# Patient Record
Sex: MALE | Race: WHITE | Employment: FULL TIME | URBAN - METROPOLITAN AREA
[De-identification: names, ages, dates, MRNs, and addresses within clinical notes are randomized per-mention and may not be internally consistent; named-entity substitution may affect disease eponyms.]

---

## 2019-03-01 ENCOUNTER — OFFICE VISIT (OUTPATIENT)
Dept: FAMILY MEDICINE CLINIC | Age: 54
End: 2019-03-01
Payer: COMMERCIAL

## 2019-03-01 VITALS
DIASTOLIC BLOOD PRESSURE: 128 MMHG | TEMPERATURE: 98.4 F | WEIGHT: 198.5 LBS | RESPIRATION RATE: 16 BRPM | BODY MASS INDEX: 29.4 KG/M2 | SYSTOLIC BLOOD PRESSURE: 210 MMHG | HEIGHT: 69 IN | OXYGEN SATURATION: 100 % | HEART RATE: 84 BPM

## 2019-03-01 DIAGNOSIS — I10 MALIGNANT HYPERTENSION: Primary | ICD-10-CM

## 2019-03-01 PROCEDURE — 93000 ELECTROCARDIOGRAM COMPLETE: CPT | Performed by: FAMILY MEDICINE

## 2019-03-01 PROCEDURE — 99214 OFFICE O/P EST MOD 30 MIN: CPT | Performed by: FAMILY MEDICINE

## 2019-03-01 ASSESSMENT — ENCOUNTER SYMPTOMS
CHEST TIGHTNESS: 0
ABDOMINAL PAIN: 0
NAUSEA: 0
VOMITING: 0
DIARRHEA: 0
APNEA: 0
SHORTNESS OF BREATH: 0
COUGH: 0
CONSTIPATION: 0
BLOOD IN STOOL: 0

## 2019-03-01 ASSESSMENT — PATIENT HEALTH QUESTIONNAIRE - PHQ9
1. LITTLE INTEREST OR PLEASURE IN DOING THINGS: 0
SUM OF ALL RESPONSES TO PHQ9 QUESTIONS 1 & 2: 0
SUM OF ALL RESPONSES TO PHQ QUESTIONS 1-9: 0
SUM OF ALL RESPONSES TO PHQ QUESTIONS 1-9: 0
2. FEELING DOWN, DEPRESSED OR HOPELESS: 0

## 2019-03-18 ENCOUNTER — OFFICE VISIT (OUTPATIENT)
Dept: FAMILY MEDICINE CLINIC | Age: 54
End: 2019-03-18
Payer: COMMERCIAL

## 2019-03-18 VITALS
WEIGHT: 197.06 LBS | OXYGEN SATURATION: 98 % | BODY MASS INDEX: 29.19 KG/M2 | HEART RATE: 75 BPM | SYSTOLIC BLOOD PRESSURE: 148 MMHG | TEMPERATURE: 98.1 F | DIASTOLIC BLOOD PRESSURE: 90 MMHG | HEIGHT: 69 IN | RESPIRATION RATE: 12 BRPM

## 2019-03-18 DIAGNOSIS — Z12.11 COLON CANCER SCREENING: ICD-10-CM

## 2019-03-18 DIAGNOSIS — E78.2 MIXED HYPERLIPIDEMIA: ICD-10-CM

## 2019-03-18 DIAGNOSIS — Z13.1 SCREENING FOR DIABETES MELLITUS (DM): ICD-10-CM

## 2019-03-18 DIAGNOSIS — I10 ESSENTIAL HYPERTENSION: Primary | ICD-10-CM

## 2019-03-18 DIAGNOSIS — Z23 NEED FOR VACCINATION: ICD-10-CM

## 2019-03-18 PROCEDURE — 99214 OFFICE O/P EST MOD 30 MIN: CPT | Performed by: FAMILY MEDICINE

## 2019-03-18 RX ORDER — VALSARTAN 160 MG/1
160 TABLET ORAL DAILY
Qty: 30 TABLET | Refills: 5 | Status: SHIPPED | OUTPATIENT
Start: 2019-03-18 | End: 2019-04-23 | Stop reason: DRUGHIGH

## 2019-03-18 RX ORDER — TRIAMTERENE AND HYDROCHLOROTHIAZIDE 37.5; 25 MG/1; MG/1
TABLET ORAL
Qty: 30 TABLET | Refills: 5 | Status: SHIPPED | OUTPATIENT
Start: 2019-03-18 | End: 2019-04-23 | Stop reason: DRUGHIGH

## 2019-03-18 RX ORDER — ATORVASTATIN CALCIUM 20 MG/1
20 TABLET, FILM COATED ORAL DAILY
Refills: 0 | COMMUNITY
Start: 2019-03-04 | End: 2019-03-18 | Stop reason: SDUPTHER

## 2019-03-18 RX ORDER — VALSARTAN 160 MG/1
160 TABLET ORAL DAILY
Refills: 0 | COMMUNITY
Start: 2019-03-04 | End: 2019-03-18 | Stop reason: SDUPTHER

## 2019-03-18 RX ORDER — ATORVASTATIN CALCIUM 20 MG/1
20 TABLET, FILM COATED ORAL DAILY
Qty: 30 TABLET | Refills: 5 | Status: SHIPPED | OUTPATIENT
Start: 2019-03-18 | End: 2019-08-29 | Stop reason: SDUPTHER

## 2019-03-18 RX ORDER — POTASSIUM CHLORIDE 750 MG/1
10 TABLET, FILM COATED, EXTENDED RELEASE ORAL DAILY
Refills: 0 | COMMUNITY
Start: 2019-03-04 | End: 2019-03-18 | Stop reason: SDUPTHER

## 2019-03-18 RX ORDER — POTASSIUM CHLORIDE 750 MG/1
10 TABLET, FILM COATED, EXTENDED RELEASE ORAL DAILY
Qty: 60 TABLET | Refills: 5 | Status: SHIPPED | OUTPATIENT
Start: 2019-03-18 | End: 2020-02-25

## 2019-03-18 RX ORDER — AMLODIPINE BESYLATE 10 MG/1
10 TABLET ORAL DAILY
Qty: 30 TABLET | Refills: 5 | Status: SHIPPED | OUTPATIENT
Start: 2019-03-18 | End: 2019-04-23 | Stop reason: ALTCHOICE

## 2019-03-18 RX ORDER — AMLODIPINE BESYLATE 10 MG/1
10 TABLET ORAL DAILY
Refills: 0 | COMMUNITY
Start: 2019-03-04 | End: 2019-03-18 | Stop reason: SDUPTHER

## 2019-03-18 RX ORDER — TRIAMTERENE AND HYDROCHLOROTHIAZIDE 37.5; 25 MG/1; MG/1
TABLET ORAL
Refills: 0 | COMMUNITY
Start: 2019-03-04 | End: 2019-03-18 | Stop reason: SDUPTHER

## 2019-03-18 ASSESSMENT — ENCOUNTER SYMPTOMS
NAUSEA: 0
CONSTIPATION: 0
ABDOMINAL PAIN: 0
CHEST TIGHTNESS: 0
APNEA: 0
SHORTNESS OF BREATH: 0
DIARRHEA: 0
VOMITING: 0
BLOOD IN STOOL: 0
COUGH: 0

## 2019-04-06 ENCOUNTER — HOSPITAL ENCOUNTER (OUTPATIENT)
Dept: LAB | Age: 54
Discharge: HOME | End: 2019-04-06
Payer: COMMERCIAL

## 2019-04-06 DIAGNOSIS — I10 ESSENTIAL HYPERTENSION: ICD-10-CM

## 2019-04-06 LAB
ALBUMIN SERPL-MCNC: 4.2 G/DL (ref 3.5–4.6)
ALP BLD-CCNC: 80 U/L (ref 35–104)
ALT SERPL-CCNC: 35 U/L (ref 0–41)
ANION GAP SERPL CALCULATED.3IONS-SCNC: 14 MEQ/L (ref 9–15)
AST SERPL-CCNC: 32 U/L (ref 0–40)
BILIRUB SERPL-MCNC: 0.5 MG/DL (ref 0.2–0.7)
BUN BLDV-MCNC: 15 MG/DL (ref 6–20)
CALCIUM SERPL-MCNC: 9.1 MG/DL (ref 8.5–9.9)
CHLORIDE BLD-SCNC: 99 MEQ/L (ref 95–107)
CO2: 25 MEQ/L (ref 20–31)
CREAT SERPL-MCNC: 1.15 MG/DL (ref 0.7–1.2)
GFR AFRICAN AMERICAN: >60
GFR NON-AFRICAN AMERICAN: >60
GLOBULIN: 2.6 G/DL (ref 2.3–3.5)
GLUCOSE BLD-MCNC: 105 MG/DL (ref 70–99)
POTASSIUM SERPL-SCNC: 4.1 MEQ/L (ref 3.4–4.9)
SODIUM BLD-SCNC: 138 MEQ/L (ref 135–144)
TOTAL PROTEIN: 6.8 G/DL (ref 6.3–8)

## 2019-04-06 PROCEDURE — 80053 COMPREHEN METABOLIC PANEL: CPT

## 2019-04-06 PROCEDURE — 36415 COLL VENOUS BLD VENIPUNCTURE: CPT

## 2019-04-16 ENCOUNTER — OFFICE VISIT (OUTPATIENT)
Dept: FAMILY MEDICINE CLINIC | Age: 54
End: 2019-04-16
Payer: COMMERCIAL

## 2019-04-16 VITALS
TEMPERATURE: 98 F | RESPIRATION RATE: 16 BRPM | HEART RATE: 76 BPM | BODY MASS INDEX: 29.53 KG/M2 | DIASTOLIC BLOOD PRESSURE: 90 MMHG | OXYGEN SATURATION: 97 % | HEIGHT: 69 IN | SYSTOLIC BLOOD PRESSURE: 160 MMHG | WEIGHT: 199.4 LBS

## 2019-04-16 DIAGNOSIS — I10 MALIGNANT HYPERTENSION: Primary | ICD-10-CM

## 2019-04-16 PROCEDURE — 99213 OFFICE O/P EST LOW 20 MIN: CPT | Performed by: FAMILY MEDICINE

## 2019-04-16 NOTE — PROGRESS NOTES
Subjective:      Patient ID: Leland Gregg is a 52 y.o. male who presents today for:     Chief Complaint   Patient presents with    Hypertension     Patient is here for a 1 month follow up HTN.  Hyperlipidemia     Patient is here for a 1 month follow up hyperlipidemia. Hypertension   This is a chronic problem. The problem has been rapidly improving since onset. The problem is uncontrolled. Pertinent negatives include no anxiety, blurred vision, chest pain, headaches, malaise/fatigue, neck pain, orthopnea, palpitations, peripheral edema, PND, shortness of breath or sweats. Risk factors for coronary artery disease include male gender. Past treatments include ACE inhibitors, diuretics and calcium channel blockers. The current treatment provides significant improvement. There are no compliance problems. patient states her blood pressures been running in the 130/90 range at home. He states that he feels better and dull headache is almost completely resolved and the blood pressure is within an improved range    Past Medical History:   Diagnosis Date    Hyperlipidemia     Hypertension      Past Surgical History:   Procedure Laterality Date    HERNIA REPAIR       No family history on file. Social History     Socioeconomic History    Marital status: Single     Spouse name: Not on file    Number of children: Not on file    Years of education: Not on file    Highest education level: Not on file   Occupational History    Not on file   Social Needs    Financial resource strain: Not on file    Food insecurity:     Worry: Not on file     Inability: Not on file    Transportation needs:     Medical: Not on file     Non-medical: Not on file   Tobacco Use    Smoking status: Light Tobacco Smoker     Packs/day: 0.25     Years: 40.00     Pack years: 10.00     Types: Cigarettes    Smokeless tobacco: Never Used   Substance and Sexual Activity    Alcohol use:  Yes    Drug use: No    Sexual activity: Yes Lifestyle    Physical activity:     Days per week: Not on file     Minutes per session: Not on file    Stress: Not on file   Relationships    Social connections:     Talks on phone: Not on file     Gets together: Not on file     Attends Protestant service: Not on file     Active member of club or organization: Not on file     Attends meetings of clubs or organizations: Not on file     Relationship status: Not on file    Intimate partner violence:     Fear of current or ex partner: Not on file     Emotionally abused: Not on file     Physically abused: Not on file     Forced sexual activity: Not on file   Other Topics Concern    Not on file   Social History Narrative    Not on file     Current Outpatient Medications on File Prior to Visit   Medication Sig Dispense Refill    amLODIPine (NORVASC) 10 MG tablet Take 1 tablet by mouth daily 30 tablet 5    atorvastatin (LIPITOR) 20 MG tablet Take 1 tablet by mouth daily 30 tablet 5    potassium chloride (KLOR-CON) 10 MEQ extended release tablet Take 1 tablet by mouth daily 60 tablet 5    triamterene-hydrochlorothiazide (MAXZIDE-25) 37.5-25 MG per tablet take 1 (ONE) tablet daily 30 tablet 5    valsartan (DIOVAN) 160 MG tablet Take 1 tablet by mouth daily 30 tablet 5     No current facility-administered medications on file prior to visit. Allergies:  Patient has no known allergies. Review of Systems   Constitutional: Negative for activity change, appetite change, fatigue and malaise/fatigue. Eyes: Negative for blurred vision. Respiratory: Negative for apnea, cough, chest tightness and shortness of breath. Cardiovascular: Negative for chest pain, palpitations, orthopnea, leg swelling and PND. Gastrointestinal: Negative for abdominal pain, blood in stool, constipation, diarrhea, nausea and vomiting. Musculoskeletal: Negative for arthralgias and neck pain. Neurological: Negative for seizures and headaches.    Psychiatric/Behavioral: Negative for hallucinations and suicidal ideas. Objective:   BP (!) 160/90 (Site: Left Upper Arm, Position: Sitting, Cuff Size: Large Adult)   Pulse 76   Temp 98 °F (36.7 °C) (Temporal)   Resp 16   Ht 5' 9\" (1.753 m)   Wt 199 lb 6.4 oz (90.4 kg)   SpO2 97%   BMI 29.45 kg/m²     Physical Exam   Constitutional: He is oriented to person, place, and time. He appears well-developed and well-nourished. No distress. HENT:   Head: Normocephalic and atraumatic. Eyes: Pupils are equal, round, and reactive to light. Conjunctivae and EOM are normal.   Neck: Normal range of motion. Cardiovascular: Normal rate, regular rhythm and normal heart sounds. Exam reveals no gallop and no friction rub. No murmur heard. Pulmonary/Chest: Effort normal and breath sounds normal. No respiratory distress. He has no wheezes. He has no rales. He exhibits no tenderness. Neurological: He is alert and oriented to person, place, and time. Skin: Skin is warm and dry. He is not diaphoretic. Psychiatric: He has a normal mood and affect. His behavior is normal. Judgment and thought content normal.   Nursing note and vitals reviewed. Assessment & Plan:     1. Malignant hypertension  Blood pressure improved but not currently at goal if baseline remains in this range we will consider adding Coreg to current regimen  Recommend close follow-upAnd to call within 1 week with home blood pressure readings and have a nurse visit to   Patient had almost complete workup in the hospital but did Not obtained stress testing    - Ambulatory referral to Cardiology      Return in about 6 weeks (around 5/28/2019).     Sakshi Diaz MD

## 2019-04-17 DIAGNOSIS — Z12.11 COLON CANCER SCREENING: ICD-10-CM

## 2019-04-21 ASSESSMENT — ENCOUNTER SYMPTOMS
CHEST TIGHTNESS: 0
SHORTNESS OF BREATH: 0
APNEA: 0
COUGH: 0
DIARRHEA: 0
CONSTIPATION: 0
BLOOD IN STOOL: 0
BLURRED VISION: 0
VOMITING: 0
NAUSEA: 0
ORTHOPNEA: 0
ABDOMINAL PAIN: 0

## 2019-04-23 ENCOUNTER — OFFICE VISIT (OUTPATIENT)
Dept: CARDIOLOGY CLINIC | Age: 54
End: 2019-04-23
Payer: COMMERCIAL

## 2019-04-23 ENCOUNTER — TELEPHONE (OUTPATIENT)
Dept: CARDIOLOGY CLINIC | Age: 54
End: 2019-04-23

## 2019-04-23 VITALS
HEIGHT: 69 IN | WEIGHT: 198 LBS | RESPIRATION RATE: 22 BRPM | SYSTOLIC BLOOD PRESSURE: 124 MMHG | DIASTOLIC BLOOD PRESSURE: 82 MMHG | OXYGEN SATURATION: 99 % | HEART RATE: 87 BPM | BODY MASS INDEX: 29.33 KG/M2

## 2019-04-23 DIAGNOSIS — I10 ESSENTIAL HYPERTENSION: Primary | ICD-10-CM

## 2019-04-23 DIAGNOSIS — R94.31 ABNORMAL FINDING ON EKG: ICD-10-CM

## 2019-04-23 PROCEDURE — 99244 OFF/OP CNSLTJ NEW/EST MOD 40: CPT | Performed by: INTERNAL MEDICINE

## 2019-04-23 RX ORDER — NEBIVOLOL 5 MG/1
5 TABLET ORAL NIGHTLY
Qty: 90 TABLET | Refills: 3 | Status: SHIPPED | OUTPATIENT
Start: 2019-04-23 | End: 2020-03-27 | Stop reason: SDUPTHER

## 2019-04-23 RX ORDER — VALSARTAN AND HYDROCHLOROTHIAZIDE 160; 25 MG/1; MG/1
1 TABLET ORAL DAILY
Qty: 90 TABLET | Refills: 3 | Status: SHIPPED | OUTPATIENT
Start: 2019-04-23 | End: 2020-01-06 | Stop reason: DRUGHIGH

## 2019-04-23 ASSESSMENT — ENCOUNTER SYMPTOMS
BLOOD IN STOOL: 0
SHORTNESS OF BREATH: 0
VOMITING: 0
NAUSEA: 0
CHEST TIGHTNESS: 0
DIARRHEA: 0
APNEA: 0

## 2019-04-23 NOTE — TELEPHONE ENCOUNTER
PER DR BOOTH PT IS TO DISCONTINUE HIS NORVASC, MAXZIDE  AND VALSARTAN- HYDROCLOROTHIAZIDEAS OF MAY 1ST      PT AWARE AND VOICED UNDERSTANDING

## 2019-04-23 NOTE — PROGRESS NOTES
[facility] CARDIOLOGY OFFICE CONSULT        Patient: Angella Hawkins  YOB: 1965  MRN: [id number]    Chief Complaint:  Chief Complaint   Patient presents with   Oj Rose Cardiologist     Referred by Dr. Jackie Suresh Hypertension       Subjective/HPI:   4/23/19: Patient presents today for evaluation of hypertension. Consulted by Dr. Eric Reed. Very pleasant 52-year-old white female. Blood pressure has remained uncontrolled. He has hyperlipidemia. EKG shows augmented report page and significant T-wave changes and possible left for Dr. Gracie Motta. We will start him on losartan 160/20 5 in the morning. Last 25 ligament the PM. Get a stress Cardiolite and then see me          Past Medical History:   Diagnosis Date    Hyperlipidemia     Hypertension        Past Surgical History:   Procedure Laterality Date    HERNIA REPAIR         No family history on file. Social History     Socioeconomic History    Marital status: Single     Spouse name: None    Number of children: None    Years of education: None    Highest education level: None   Occupational History    None   Social Needs    Financial resource strain: None    Food insecurity:     Worry: None     Inability: None    Transportation needs:     Medical: None     Non-medical: None   Tobacco Use    Smoking status: Light Tobacco Smoker     Packs/day: 0.25     Years: 40.00     Pack years: 10.00     Types: Cigarettes    Smokeless tobacco: Never Used   Substance and Sexual Activity    Alcohol use:  Yes    Drug use: No    Sexual activity: Yes   Lifestyle    Physical activity:     Days per week: None     Minutes per session: None    Stress: None   Relationships    Social connections:     Talks on phone: None     Gets together: None     Attends Congregational service: None     Active member of club or organization: None     Attends meetings of clubs or organizations: None     Relationship status: None    Intimate partner violence:     Fear of current or ex partner: None     Emotionally abused: None     Physically abused: None     Forced sexual activity: None   Other Topics Concern    None   Social History Narrative    None       No Known Allergies    Current Outpatient Medications   Medication Sig Dispense Refill    amLODIPine (NORVASC) 10 MG tablet Take 1 tablet by mouth daily 30 tablet 5    atorvastatin (LIPITOR) 20 MG tablet Take 1 tablet by mouth daily 30 tablet 5    potassium chloride (KLOR-CON) 10 MEQ extended release tablet Take 1 tablet by mouth daily 60 tablet 5    triamterene-hydrochlorothiazide (MAXZIDE-25) 37.5-25 MG per tablet take 1 (ONE) tablet daily 30 tablet 5    valsartan (DIOVAN) 160 MG tablet Take 1 tablet by mouth daily 30 tablet 5     No current facility-administered medications for this visit. Review of Systems:   Review of Systems   Constitutional: Negative for activity change, appetite change, diaphoresis, fatigue and unexpected weight change. HENT: Negative for facial swelling, nosebleeds, trouble swallowing and voice change. Respiratory: Negative for apnea, chest tightness, shortness of breath and wheezing. Cardiovascular: Negative for chest pain, palpitations and leg swelling. Gastrointestinal: Negative for abdominal distention, anal bleeding, blood in stool, diarrhea, nausea and vomiting. Genitourinary: Negative for decreased urine volume and dysuria. Musculoskeletal: Negative for gait problem, myalgias, neck pain and neck stiffness. Skin: Negative for color change, pallor, rash and wound. Neurological: Negative for dizziness, seizures, syncope, facial asymmetry, weakness, light-headedness, numbness and headaches. Hematological: Does not bruise/bleed easily. Psychiatric/Behavioral: Negative for agitation, behavioral problems, confusion, hallucinations and suicidal ideas. The patient is not nervous/anxious. All other systems reviewed and are negative.       Review of System is negative except for as mentioned above. Physical Examination:    /82 (Site: Right Upper Arm, Position: Sitting, Cuff Size: Medium Adult)   Pulse 87   Resp 22   Ht 5' 9\" (1.753 m)   Wt 198 lb (89.8 kg)   SpO2 99%   BMI 29.24 kg/m²    Physical Exam   Constitutional: He appears healthy. No distress. HENT:   Nose: Nose normal.   Mouth/Throat: Dentition is normal. Oropharynx is clear. Eyes: Pupils are equal, round, and reactive to light. Conjunctivae are normal.   Neck: Normal range of motion and thyroid normal. Neck supple. Cardiovascular: Regular rhythm, S1 normal, S2 normal, normal heart sounds, intact distal pulses and normal pulses. PMI is not displaced. No murmur heard. Pulmonary/Chest: He has no wheezes. He has no rales. He exhibits no tenderness. Abdominal: Soft. Bowel sounds are normal. He exhibits no distension and no mass. There is no splenomegaly or hepatomegaly. There is no tenderness. No hernia. Neurological: He is alert and oriented to person, place, and time. He has normal motor skills. Gait normal.   Skin: Skin is warm and dry. No cyanosis. No jaundice. Nails show no clubbing.        LABS:  CBC:   Lab Results   Component Value Date    WBC 7.4 05/02/2015    RBC 5.79 05/02/2015    HGB 17.6 05/02/2015    HCT 52.9 05/02/2015    MCV 91.3 05/02/2015    MCH 30.4 05/02/2015    MCHC 33.3 05/02/2015    RDW 13.8 05/02/2015     05/02/2015    MPV 11.7 05/02/2015     Lipids:  Lab Results   Component Value Date    CHOL 212 (H) 01/02/2016    CHOL 216 (H) 05/02/2015     Lab Results   Component Value Date    TRIG 421 (H) 01/02/2016    TRIG 311 (H) 05/02/2015     Lab Results   Component Value Date    HDL 38 (L) 01/02/2016    HDL 32 (L) 05/02/2015     Lab Results   Component Value Date    LDLCALC see below 01/02/2016    LDLCALC 122 05/02/2015     No results found for: LABVLDL, VLDL  No results found for: CHOLHDLRATIO  CMP:    Lab Results   Component Value Date     04/06/2019    K 4.1 04/06/2019    CL 99

## 2019-05-03 ASSESSMENT — ENCOUNTER SYMPTOMS
COLOR CHANGE: 0
WHEEZING: 0
TROUBLE SWALLOWING: 0
ANAL BLEEDING: 0
VOICE CHANGE: 0
ABDOMINAL DISTENTION: 0
FACIAL SWELLING: 0

## 2019-05-14 ENCOUNTER — HOSPITAL ENCOUNTER (OUTPATIENT)
Dept: NON INVASIVE DIAGNOSTICS | Age: 54
Discharge: HOME | End: 2019-05-14
Payer: COMMERCIAL

## 2019-05-14 ENCOUNTER — HOSPITAL ENCOUNTER (OUTPATIENT)
Dept: NUCLEAR MEDICINE | Age: 54
Discharge: HOME | End: 2019-05-16
Payer: COMMERCIAL

## 2019-05-14 VITALS — SYSTOLIC BLOOD PRESSURE: 198 MMHG | DIASTOLIC BLOOD PRESSURE: 112 MMHG | HEART RATE: 85 BPM

## 2019-05-14 DIAGNOSIS — I10 ESSENTIAL HYPERTENSION: ICD-10-CM

## 2019-05-14 DIAGNOSIS — R94.31 ABNORMAL FINDING ON EKG: ICD-10-CM

## 2019-05-14 PROCEDURE — 2580000003 HC RX 258: Performed by: INTERNAL MEDICINE

## 2019-05-14 PROCEDURE — 93017 CV STRESS TEST TRACING ONLY: CPT

## 2019-05-14 PROCEDURE — 6360000002 HC RX W HCPCS: Performed by: INTERNAL MEDICINE

## 2019-05-14 PROCEDURE — A9502 TC99M TETROFOSMIN: HCPCS | Performed by: INTERNAL MEDICINE

## 2019-05-14 PROCEDURE — 78452 HT MUSCLE IMAGE SPECT MULT: CPT

## 2019-05-14 PROCEDURE — 3430000000 HC RX DIAGNOSTIC RADIOPHARMACEUTICAL: Performed by: INTERNAL MEDICINE

## 2019-05-14 RX ORDER — SODIUM CHLORIDE 0.9 % (FLUSH) 0.9 %
10 SYRINGE (ML) INJECTION PRN
Status: DISCONTINUED | OUTPATIENT
Start: 2019-05-14 | End: 2019-05-17 | Stop reason: HOSPADM

## 2019-05-14 RX ADMIN — Medication 10 ML: at 10:39

## 2019-05-14 RX ADMIN — REGADENOSON 0.4 MG: 0.08 INJECTION, SOLUTION INTRAVENOUS at 12:10

## 2019-05-14 RX ADMIN — Medication 10 ML: at 12:12

## 2019-05-14 RX ADMIN — Medication 10 ML: at 12:10

## 2019-05-14 RX ADMIN — TETROFOSMIN 10.4 MILLICURIE: 1.38 INJECTION, POWDER, LYOPHILIZED, FOR SOLUTION INTRAVENOUS at 10:38

## 2019-05-14 RX ADMIN — TETROFOSMIN 33 MILLICURIE: 1.38 INJECTION, POWDER, LYOPHILIZED, FOR SOLUTION INTRAVENOUS at 12:11

## 2019-05-15 PROCEDURE — 93018 CV STRESS TEST I&R ONLY: CPT | Performed by: INTERNAL MEDICINE

## 2019-05-15 PROCEDURE — 93016 CV STRESS TEST SUPVJ ONLY: CPT | Performed by: INTERNAL MEDICINE

## 2019-05-15 PROCEDURE — 78452 HT MUSCLE IMAGE SPECT MULT: CPT | Performed by: INTERNAL MEDICINE

## 2019-05-15 NOTE — PROCEDURES
[address] [address]                              CARDIAC STRESS TEST    PATIENT NAME: Luis Anderson                       :        1965  MED REC NO:   [id number]                              ROOM:  ACCOUNT NO:   [de-identified]                           ADMIT DATE: 2019  PROVIDER:     Jonatan Newell MD    CARDIOVASCULAR DIAGNOSTIC DEPARTMENT    DATE OF STUDY:  2019    INDICATIONS:  Chest pain, abnormal EKG. TECHNIQUE:  At rest, the patient was injected with 10.4 mCi of Myoview. Resting images were obtained. The patient was then given 0.4 mg of  Lexiscan followed by administration of 33.0 mCi of Myoview. Stress  tomographic images were then obtained. Left ventricular ejection  fraction and gated wall motion were acquired. RESULTS:  Resting EKG revealed sinus rhythm. Nonspecific ST-segment  changes which increased with exercise. Especially in the lateral views. The patient now complains of chest pain. The changes are consistent  with ischemia. IMAGING RESULTS:  Review of the rest and stress tomographic images  revealed homogenous myocardial perfusion with no evidence of prior  myocardial infarction or ischemia. Left ventricular ejection fraction  is normal at 61%. TID ratio is 1.01, which is within normal limits. IMPRESSION:  1. Abnormal EKG changes with exercise probably due to LVH with  significant ST-segment changes. 2.  Homogeneous myocardial perfusion with no evidence of prior  myocardial infarction or ischemia on perfusion imaging. 3.  Normal left ventricular ejection fraction of 61%. 4.  TID ratio is 1.01, which is within normal limits. 5.  If clinically suspected significant coronary artery disease,  recommended cardiac catheterization.         Kamron De Luna MD    D: 05/15/2019 9:43:36       T: 05/15/2019 10:39:44     [email or web address]  Job#: [id number]     Doc#: [id number]    CC:

## 2019-05-22 ENCOUNTER — OFFICE VISIT (OUTPATIENT)
Dept: CARDIOLOGY CLINIC | Age: 54
End: 2019-05-22
Payer: COMMERCIAL

## 2019-05-22 VITALS
HEART RATE: 70 BPM | OXYGEN SATURATION: 99 % | RESPIRATION RATE: 20 BRPM | HEIGHT: 69 IN | DIASTOLIC BLOOD PRESSURE: 82 MMHG | BODY MASS INDEX: 29.62 KG/M2 | WEIGHT: 200 LBS | SYSTOLIC BLOOD PRESSURE: 122 MMHG

## 2019-05-22 DIAGNOSIS — R94.31 ABNORMAL FINDING ON EKG: ICD-10-CM

## 2019-05-22 DIAGNOSIS — I10 ESSENTIAL HYPERTENSION: Primary | ICD-10-CM

## 2019-05-22 PROCEDURE — 99214 OFFICE O/P EST MOD 30 MIN: CPT | Performed by: INTERNAL MEDICINE

## 2019-05-22 ASSESSMENT — ENCOUNTER SYMPTOMS
WHEEZING: 0
APNEA: 0
FACIAL SWELLING: 0
VOICE CHANGE: 0
VOMITING: 0
DIARRHEA: 0
SHORTNESS OF BREATH: 0
BLOOD IN STOOL: 0
NAUSEA: 0
ANAL BLEEDING: 0
TROUBLE SWALLOWING: 0
COLOR CHANGE: 0
CHEST TIGHTNESS: 0
ABDOMINAL DISTENTION: 0

## 2019-05-22 NOTE — PROGRESS NOTES
[facility] CARDIOLOGY OFFICE FOLLOW-UP      Patient: Brandt Murcia  YOB: 1965  MRN: [id number]    Chief Complaint:  Chief Complaint   Patient presents with    Results     Stress test done    Hypertension         Subjective/HPI:  5/22/19: Patient presents today for follow-up of hypertension. It remains extremely well-controlled. He is very happy with the results. Stress test was reviewed with him. Perfusion was normal. EKG showed some ST segment changes probably due to LVH. He is very active. Continue with the same medications. If he has chest pain then consider cardiac catheterization he'll see me in 3 months     4/23/19: Patient presents today for evaluation of hypertension. Consulted by Dr. Mahsa Shoemaker. Very pleasant 40-year-old white female. Blood pressure has remained uncontrolled. He has hyperlipidemia. EKG shows augmented report page and significant T-wave changes and possible left for Dr. Ghanshyam Ratliff. We will start him on losartan 160/20 5 in the morning. Last 25 ligament the PM. Get a stress Cardiolite and then see me         Past Medical History:   Diagnosis Date    Hyperlipidemia     Hypertension        Past Surgical History:   Procedure Laterality Date    HERNIA REPAIR         No family history on file. Social History     Socioeconomic History    Marital status: Single     Spouse name: None    Number of children: None    Years of education: None    Highest education level: None   Occupational History    None   Social Needs    Financial resource strain: None    Food insecurity:     Worry: None     Inability: None    Transportation needs:     Medical: None     Non-medical: None   Tobacco Use    Smoking status: Light Tobacco Smoker     Packs/day: 0.25     Years: 40.00     Pack years: 10.00     Types: Cigarettes    Smokeless tobacco: Never Used   Substance and Sexual Activity    Alcohol use:  Yes    Drug use: No    Sexual activity: Yes   Lifestyle    Physical activity:     Days per week: None     Minutes per session: None    Stress: None   Relationships    Social connections:     Talks on phone: None     Gets together: None     Attends Voodoo service: None     Active member of club or organization: None     Attends meetings of clubs or organizations: None     Relationship status: None    Intimate partner violence:     Fear of current or ex partner: None     Emotionally abused: None     Physically abused: None     Forced sexual activity: None   Other Topics Concern    None   Social History Narrative    None       No Known Allergies    Current Outpatient Medications   Medication Sig Dispense Refill    valsartan-hydrochlorothiazide (DIOVAN-HCT) 160-25 MG per tablet Take 1 tablet by mouth daily 90 tablet 3    nebivolol (BYSTOLIC) 5 MG tablet Take 1 tablet by mouth nightly 90 tablet 3    atorvastatin (LIPITOR) 20 MG tablet Take 1 tablet by mouth daily 30 tablet 5    potassium chloride (KLOR-CON) 10 MEQ extended release tablet Take 1 tablet by mouth daily 60 tablet 5     No current facility-administered medications for this visit. Review of Systems:   Review of Systems   Constitutional: Negative for activity change, appetite change, diaphoresis, fatigue and unexpected weight change. HENT: Negative for facial swelling, nosebleeds, trouble swallowing and voice change. Respiratory: Negative for apnea, chest tightness, shortness of breath and wheezing. Cardiovascular: Negative for chest pain, palpitations and leg swelling. Gastrointestinal: Negative for abdominal distention, anal bleeding, blood in stool, diarrhea, nausea and vomiting. Genitourinary: Negative for decreased urine volume and dysuria. Musculoskeletal: Negative for gait problem, myalgias, neck pain and neck stiffness. Skin: Negative for color change, pallor, rash and wound. Neurological: Negative for dizziness, seizures, syncope, facial asymmetry, weakness, light-headedness, numbness and headaches. Hematological: Does not bruise/bleed easily. Psychiatric/Behavioral: Negative for agitation, behavioral problems, confusion, hallucinations and suicidal ideas. The patient is not nervous/anxious. All other systems reviewed and are negative. Review of System is negative except for as mentioned above. Physical Examination:    /82 (Site: Right Upper Arm, Position: Sitting, Cuff Size: Medium Adult)   Pulse 70   Resp 20   Ht 5' 9\" (1.753 m)   Wt 200 lb (90.7 kg)   SpO2 99%   BMI 29.53 kg/m²    Physical Exam   Constitutional: He appears healthy. No distress. HENT:   Nose: Nose normal.   Mouth/Throat: Dentition is normal. Oropharynx is clear. Eyes: Pupils are equal, round, and reactive to light. Conjunctivae are normal.   Neck: Normal range of motion and thyroid normal. Neck supple. Cardiovascular: Regular rhythm, S1 normal, S2 normal, normal heart sounds, intact distal pulses and normal pulses. PMI is not displaced. No murmur heard. Pulmonary/Chest: He has no wheezes. He has no rales. He exhibits no tenderness. Abdominal: Soft. Bowel sounds are normal. He exhibits no distension and no mass. There is no splenomegaly or hepatomegaly. There is no tenderness. No hernia. Neurological: He is alert and oriented to person, place, and time. He has normal motor skills. Gait normal.   Skin: Skin is warm and dry. No cyanosis. No jaundice. Nails show no clubbing.        LABS:  CBC:   Lab Results   Component Value Date    WBC 7.4 05/02/2015    RBC 5.79 05/02/2015    HGB 17.6 05/02/2015    HCT 52.9 05/02/2015    MCV 91.3 05/02/2015    MCH 30.4 05/02/2015    MCHC 33.3 05/02/2015    RDW 13.8 05/02/2015     05/02/2015    MPV 11.7 05/02/2015     Lipids:  Lab Results   Component Value Date    CHOL 212 (H) 01/02/2016    CHOL 216 (H) 05/02/2015     Lab Results   Component Value Date    TRIG 421 (H) 01/02/2016    TRIG 311 (H) 05/02/2015     Lab Results   Component Value Date    HDL 38 (L) 01/02/2016    HDL 32 (L) 05/02/2015     Lab Results   Component Value Date    LDLCALC see below 01/02/2016    LDLCALC 122 05/02/2015     No results found for: LABVLDL, VLDL  No results found for: CHOLHDLRATIO  CMP:    Lab Results   Component Value Date     04/06/2019    K 4.1 04/06/2019    CL 99 04/06/2019    CO2 25 04/06/2019    BUN 15 04/06/2019    CREATININE 1.15 04/06/2019    GFRAA >60.0 04/06/2019    LABGLOM >60.0 04/06/2019    GLUCOSE 105 04/06/2019    PROT 6.8 04/06/2019    LABALBU 4.2 04/06/2019    CALCIUM 9.1 04/06/2019    BILITOT 0.5 04/06/2019    ALKPHOS 80 04/06/2019    AST 32 04/06/2019    ALT 35 04/06/2019     BMP:    Lab Results   Component Value Date     04/06/2019    K 4.1 04/06/2019    CL 99 04/06/2019    CO2 25 04/06/2019    BUN 15 04/06/2019    LABALBU 4.2 04/06/2019    CREATININE 1.15 04/06/2019    CALCIUM 9.1 04/06/2019    GFRAA >60.0 04/06/2019    LABGLOM >60.0 04/06/2019    GLUCOSE 105 04/06/2019     Magnesium:  No results found for: MG  TSH:No results found for: TSHFT4, TSH    Patient Active Problem List   Diagnosis    Hyperlipidemia    Hypertension       There are no discontinued medications. Modified Medications    No medications on file       No orders of the defined types were placed in this encounter. Assessment:    1. Essential hypertension    2. Abnormal finding on EKG       Plan:   Stay on same medications. See me in 4 months. This note was partially generated using Dragon voice recognition system, and there may be some incorrect words, spellings, punctuation that were not noticed in checking the note before saving.         Electronically signed by Mitra Woodson MD on 5/22/2019 at 3:31 PM

## 2019-05-28 ENCOUNTER — OFFICE VISIT (OUTPATIENT)
Dept: FAMILY MEDICINE CLINIC | Age: 54
End: 2019-05-28
Payer: COMMERCIAL

## 2019-05-28 VITALS
OXYGEN SATURATION: 97 % | WEIGHT: 199 LBS | HEART RATE: 63 BPM | HEIGHT: 69 IN | SYSTOLIC BLOOD PRESSURE: 142 MMHG | BODY MASS INDEX: 29.47 KG/M2 | RESPIRATION RATE: 16 BRPM | TEMPERATURE: 97.5 F | DIASTOLIC BLOOD PRESSURE: 84 MMHG

## 2019-05-28 DIAGNOSIS — I10 ESSENTIAL HYPERTENSION: Primary | ICD-10-CM

## 2019-05-28 PROCEDURE — 99213 OFFICE O/P EST LOW 20 MIN: CPT | Performed by: FAMILY MEDICINE

## 2019-05-28 NOTE — PROGRESS NOTES
Attends Caodaism service: Not on file     Active member of club or organization: Not on file     Attends meetings of clubs or organizations: Not on file     Relationship status: Not on file    Intimate partner violence:     Fear of current or ex partner: Not on file     Emotionally abused: Not on file     Physically abused: Not on file     Forced sexual activity: Not on file   Other Topics Concern    Not on file   Social History Narrative    Not on file     Current Outpatient Medications on File Prior to Visit   Medication Sig Dispense Refill    valsartan-hydrochlorothiazide (DIOVAN-HCT) 160-25 MG per tablet Take 1 tablet by mouth daily 90 tablet 3    nebivolol (BYSTOLIC) 5 MG tablet Take 1 tablet by mouth nightly 90 tablet 3    atorvastatin (LIPITOR) 20 MG tablet Take 1 tablet by mouth daily 30 tablet 5    potassium chloride (KLOR-CON) 10 MEQ extended release tablet Take 1 tablet by mouth daily 60 tablet 5     No current facility-administered medications on file prior to visit. Allergies:  Patient has no known allergies. Review of Systems   Constitutional: Negative for activity change, appetite change, fatigue and malaise/fatigue. Eyes: Negative for blurred vision. Respiratory: Negative for apnea, cough, chest tightness and shortness of breath. Cardiovascular: Negative for chest pain, palpitations, orthopnea, leg swelling and PND. Gastrointestinal: Negative for abdominal pain, blood in stool, constipation, diarrhea, nausea and vomiting. Musculoskeletal: Negative for arthralgias and neck pain. Neurological: Negative for seizures and headaches. Psychiatric/Behavioral: Negative for hallucinations and suicidal ideas.        Objective:   BP (!) 142/84 (Site: Left Upper Arm, Position: Sitting, Cuff Size: Large Adult)   Pulse 63   Temp 97.5 °F (36.4 °C) (Temporal)   Resp 16   Ht 5' 9\" (1.753 m)   Wt 199 lb (90.3 kg)   SpO2 97%   BMI 29.39 kg/m²     Physical Exam   Constitutional: He is oriented to person, place, and time. He appears well-developed and well-nourished. No distress. HENT:   Head: Normocephalic and atraumatic. Eyes: Pupils are equal, round, and reactive to light. Conjunctivae and EOM are normal.   Neck: Normal range of motion. Cardiovascular: Normal rate, regular rhythm and normal heart sounds. Exam reveals no gallop and no friction rub. No murmur heard. Pulmonary/Chest: Effort normal and breath sounds normal. No respiratory distress. He has no wheezes. He has no rales. He exhibits no tenderness. Neurological: He is alert and oriented to person, place, and time. Skin: Skin is warm and dry. He is not diaphoretic. Psychiatric: He has a normal mood and affect. His behavior is normal. Judgment and thought content normal.   Nursing note and vitals reviewed. Assessment & Plan:     1. Essential hypertension  Continue current medication, if blood pressure remains elevated will increase medication  For now, we will have patient take home blood pressure and monitor accordingly      Return in about 6 months (around 11/28/2019) for F/u HTN, hyperlipid.     Charissa Savage MD

## 2019-05-28 NOTE — LETTER
[address]  Phone: [phone]  Fax: [phone]    Hussain Rodriguez MD        May 28, 2019     Patient: Viky Eduardo   YOB: 1965   Date of Visit: 5/28/2019       To Whom It May Concern: It is my medical opinion that Viky Eduardo should reduce work hours to 40 hours per week until 10/01/19.       If you have any questions or concerns, please don't hesitate to call.     Sincerely,        Hussain Rodriguez MD

## 2019-06-03 ASSESSMENT — ENCOUNTER SYMPTOMS
BLOOD IN STOOL: 0
CHEST TIGHTNESS: 0
DIARRHEA: 0
ORTHOPNEA: 0
APNEA: 0
BLURRED VISION: 0
ABDOMINAL PAIN: 0
VOMITING: 0
COUGH: 0
CONSTIPATION: 0
SHORTNESS OF BREATH: 0
NAUSEA: 0

## 2019-08-29 DIAGNOSIS — E78.2 MIXED HYPERLIPIDEMIA: ICD-10-CM

## 2019-08-30 RX ORDER — ATORVASTATIN CALCIUM 20 MG/1
20 TABLET, FILM COATED ORAL DAILY
Qty: 30 TABLET | Refills: 5 | Status: SHIPPED | OUTPATIENT
Start: 2019-08-30 | End: 2020-02-25

## 2019-10-23 ENCOUNTER — OFFICE VISIT (OUTPATIENT)
Dept: CARDIOLOGY CLINIC | Age: 54
End: 2019-10-23
Payer: COMMERCIAL

## 2019-10-23 VITALS
RESPIRATION RATE: 12 BRPM | HEIGHT: 69 IN | WEIGHT: 199 LBS | SYSTOLIC BLOOD PRESSURE: 140 MMHG | HEART RATE: 64 BPM | BODY MASS INDEX: 29.47 KG/M2 | DIASTOLIC BLOOD PRESSURE: 80 MMHG

## 2019-10-23 DIAGNOSIS — I10 ESSENTIAL HYPERTENSION: Primary | ICD-10-CM

## 2019-10-23 PROBLEM — F17.210 NICOTINE DEPENDENCE, CIGARETTES, UNCOMPLICATED: Status: ACTIVE | Noted: 2019-03-01

## 2019-10-23 PROBLEM — I63.81 CEREBROVASCULAR ACCIDENT (CVA) DUE TO OCCLUSION OF SMALL ARTERY (HCC): Status: ACTIVE | Noted: 2019-03-01

## 2019-10-23 PROBLEM — I50.9 HEART FAILURE, UNSPECIFIED (HCC): Status: ACTIVE | Noted: 2019-03-01

## 2019-10-23 PROCEDURE — 99213 OFFICE O/P EST LOW 20 MIN: CPT | Performed by: INTERNAL MEDICINE

## 2019-10-23 ASSESSMENT — ENCOUNTER SYMPTOMS
COLOR CHANGE: 0
VOMITING: 0
DIARRHEA: 0
SHORTNESS OF BREATH: 0
NAUSEA: 0
ANAL BLEEDING: 0
CHEST TIGHTNESS: 0
APNEA: 0
ABDOMINAL PAIN: 0
COUGH: 0
BLOOD IN STOOL: 0
ABDOMINAL DISTENTION: 0

## 2020-01-06 ENCOUNTER — OFFICE VISIT (OUTPATIENT)
Dept: FAMILY MEDICINE CLINIC | Age: 55
End: 2020-01-06
Payer: COMMERCIAL

## 2020-01-06 VITALS
OXYGEN SATURATION: 98 % | WEIGHT: 204.2 LBS | TEMPERATURE: 97.9 F | RESPIRATION RATE: 12 BRPM | SYSTOLIC BLOOD PRESSURE: 176 MMHG | DIASTOLIC BLOOD PRESSURE: 96 MMHG | HEART RATE: 70 BPM | HEIGHT: 69 IN | BODY MASS INDEX: 30.24 KG/M2

## 2020-01-06 PROCEDURE — 99214 OFFICE O/P EST MOD 30 MIN: CPT | Performed by: FAMILY MEDICINE

## 2020-01-06 RX ORDER — VALSARTAN AND HYDROCHLOROTHIAZIDE 320; 12.5 MG/1; MG/1
1 TABLET, FILM COATED ORAL DAILY
Qty: 30 TABLET | Refills: 0 | Status: SHIPPED | OUTPATIENT
Start: 2020-01-06 | End: 2020-01-09

## 2020-01-06 ASSESSMENT — PATIENT HEALTH QUESTIONNAIRE - PHQ9
2. FEELING DOWN, DEPRESSED OR HOPELESS: 0
1. LITTLE INTEREST OR PLEASURE IN DOING THINGS: 0
SUM OF ALL RESPONSES TO PHQ9 QUESTIONS 1 & 2: 0
SUM OF ALL RESPONSES TO PHQ QUESTIONS 1-9: 0
SUM OF ALL RESPONSES TO PHQ QUESTIONS 1-9: 0

## 2020-01-06 NOTE — PROGRESS NOTES
Worry: Not on file     Inability: Not on file    Transportation needs:     Medical: Not on file     Non-medical: Not on file   Tobacco Use    Smoking status: Light Tobacco Smoker     Packs/day: 0.25     Years: 40.00     Pack years: 10.00     Types: Cigarettes    Smokeless tobacco: Never Used   Substance and Sexual Activity    Alcohol use: Yes    Drug use: No    Sexual activity: Yes   Lifestyle    Physical activity:     Days per week: Not on file     Minutes per session: Not on file    Stress: Not on file   Relationships    Social connections:     Talks on phone: Not on file     Gets together: Not on file     Attends [employer] service: Not on file     Active member of club or organization: Not on file     Attends meetings of clubs or organizations: Not on file     Relationship status: Not on file    Intimate partner violence:     Fear of current or ex partner: Not on file     Emotionally abused: Not on file     Physically abused: Not on file     Forced sexual activity: Not on file   Other Topics Concern    Not on file   Social History Narrative    Not on file     Current Outpatient Medications on File Prior to Visit   Medication Sig Dispense Refill    atorvastatin (LIPITOR) 20 MG tablet Take 1 tablet by mouth daily 30 tablet 5    nebivolol (BYSTOLIC) 5 MG tablet Take 1 tablet by mouth nightly 90 tablet 3    potassium chloride (KLOR-CON) 10 MEQ extended release tablet Take 1 tablet by mouth daily 60 tablet 5     No current facility-administered medications on file prior to visit. Allergies:  Patient has no known allergies. Review of Systems   Constitutional: Negative for activity change, appetite change, fatigue and malaise/fatigue. Eyes: Negative for blurred vision. Respiratory: Negative for apnea, cough, chest tightness and shortness of breath. Cardiovascular: Negative for chest pain, palpitations, orthopnea, leg swelling and PND.    Gastrointestinal: Negative for abdominal pain, blood in stool, constipation, diarrhea, nausea and vomiting. Musculoskeletal: Negative for arthralgias, myalgias and neck pain. Neurological: Negative for focal weakness, seizures and headaches. Psychiatric/Behavioral: Negative for hallucinations and suicidal ideas. Objective:   BP (!) 176/96 (Site: Left Upper Arm, Position: Sitting, Cuff Size: Large Adult)   Pulse 70   Temp 97.9 °F (36.6 °C) (Temporal)   Resp 12   Ht 5' 9\" (1.753 m)   Wt 204 lb 3.2 oz (92.6 kg)   SpO2 98%   BMI 30.16 kg/m²     Physical Exam  Vitals signs and nursing note reviewed. Constitutional:       General: He is not in acute distress. Appearance: Normal appearance. He is well-developed. He is not diaphoretic. HENT:      Head: Normocephalic and atraumatic. Nose: Nose normal.      Mouth/Throat:      Mouth: Mucous membranes are moist.      Pharynx: Oropharynx is clear. Eyes:      Conjunctiva/sclera: Conjunctivae normal.      Pupils: Pupils are equal, round, and reactive to light. Neck:      Musculoskeletal: Normal range of motion. Cardiovascular:      Rate and Rhythm: Normal rate and regular rhythm. Heart sounds: Normal heart sounds. No murmur. No friction rub. No gallop. Pulmonary:      Effort: Pulmonary effort is normal. No respiratory distress. Breath sounds: Normal breath sounds. No wheezing or rales. Chest:      Chest wall: No tenderness. Abdominal:      General: Abdomen is flat. Bowel sounds are normal.      Palpations: Abdomen is soft. Tenderness: There is no tenderness. Skin:     General: Skin is warm and dry. Neurological:      General: No focal deficit present. Mental Status: He is alert and oriented to person, place, and time. Mental status is at baseline. Cranial Nerves: No cranial nerve deficit. Sensory: No sensory deficit. Psychiatric:         Behavior: Behavior normal.         Thought Content:  Thought content normal.         Judgment: Judgment normal.

## 2020-01-09 ASSESSMENT — ENCOUNTER SYMPTOMS
APNEA: 0
DIARRHEA: 0
ORTHOPNEA: 0
SHORTNESS OF BREATH: 0
CONSTIPATION: 0
COUGH: 0
VOMITING: 0
NAUSEA: 0
BLURRED VISION: 0
CHEST TIGHTNESS: 0
BLOOD IN STOOL: 0
ABDOMINAL PAIN: 0

## 2020-01-09 NOTE — TELEPHONE ENCOUNTER
Pharmacy is requesting medication refill.  Please approve or deny this request.    Rx requested:  Requested Prescriptions     Pending Prescriptions Disp Refills    valsartan-hydrochlorothiazide (DIOVAN-HCT) 320-12.5 MG per tablet [Pharmacy Med Name: VALSARTAN-HYDROCHLOROTHIAZIDE 320-12.5MG TABLET] 30 tablet 0     Sig: Take 1 tablet by mouth daily         Last Office Visit:   1/6/2020      Next Visit Date:  Future Appointments   Date Time Provider [facility]   2/3/2020  2:00 PM [facility]   4/21/2020 11:45 AM Vincent Eric MD [address]

## 2020-01-10 RX ORDER — VALSARTAN AND HYDROCHLOROTHIAZIDE 320; 12.5 MG/1; MG/1
1 TABLET, FILM COATED ORAL DAILY
Qty: 30 TABLET | Refills: 0 | Status: SHIPPED | OUTPATIENT
Start: 2020-01-10 | End: 2020-02-03 | Stop reason: SDUPTHER

## 2020-01-18 ENCOUNTER — HOSPITAL ENCOUNTER (OUTPATIENT)
Dept: LAB | Age: 55
Discharge: HOME | End: 2020-01-18
Payer: COMMERCIAL

## 2020-01-18 LAB
ALBUMIN SERPL-MCNC: 4.1 G/DL (ref 3.5–4.6)
ALP BLD-CCNC: 66 U/L (ref 35–104)
ALT SERPL-CCNC: 25 U/L (ref 0–41)
ANION GAP SERPL CALCULATED.3IONS-SCNC: 12 MEQ/L (ref 9–15)
AST SERPL-CCNC: 24 U/L (ref 0–40)
BASOPHILS ABSOLUTE: 0.1 K/UL (ref 0–0.2)
BASOPHILS RELATIVE PERCENT: 0.7 %
BILIRUB SERPL-MCNC: 0.5 MG/DL (ref 0.2–0.7)
BUN BLDV-MCNC: 12 MG/DL (ref 6–20)
CALCIUM SERPL-MCNC: 9 MG/DL (ref 8.5–9.9)
CHLORIDE BLD-SCNC: 100 MEQ/L (ref 95–107)
CHOLESTEROL, TOTAL: 123 MG/DL (ref 0–199)
CO2: 28 MEQ/L (ref 20–31)
CREAT SERPL-MCNC: 1.05 MG/DL (ref 0.7–1.2)
EOSINOPHILS ABSOLUTE: 0.2 K/UL (ref 0–0.7)
EOSINOPHILS RELATIVE PERCENT: 2.1 %
GFR AFRICAN AMERICAN: >60
GFR NON-AFRICAN AMERICAN: >60
GLOBULIN: 2.9 G/DL (ref 2.3–3.5)
GLUCOSE BLD-MCNC: 88 MG/DL (ref 70–99)
HCT VFR BLD CALC: 51.4 % (ref 42–52)
HDLC SERPL-MCNC: 33 MG/DL (ref 40–59)
HEMOGLOBIN: 17.6 G/DL (ref 14–18)
LDL CHOLESTEROL CALCULATED: 42 MG/DL (ref 0–129)
LYMPHOCYTES ABSOLUTE: 2.4 K/UL (ref 1–4.8)
LYMPHOCYTES RELATIVE PERCENT: 30 %
MCH RBC QN AUTO: 32.3 PG (ref 27–31.3)
MCHC RBC AUTO-ENTMCNC: 34.3 % (ref 33–37)
MCV RBC AUTO: 94.4 FL (ref 80–100)
MONOCYTES ABSOLUTE: 0.8 K/UL (ref 0.2–0.8)
MONOCYTES RELATIVE PERCENT: 10.4 %
NEUTROPHILS ABSOLUTE: 4.5 K/UL (ref 1.4–6.5)
NEUTROPHILS RELATIVE PERCENT: 56.8 %
PDW BLD-RTO: 14.7 % (ref 11.5–14.5)
PLATELET # BLD: 170 K/UL (ref 130–400)
POTASSIUM SERPL-SCNC: 4.5 MEQ/L (ref 3.4–4.9)
RBC # BLD: 5.44 M/UL (ref 4.7–6.1)
SODIUM BLD-SCNC: 140 MEQ/L (ref 135–144)
TOTAL PROTEIN: 7 G/DL (ref 6.3–8)
TRIGL SERPL-MCNC: 242 MG/DL (ref 0–150)
WBC # BLD: 8 K/UL (ref 4.8–10.8)

## 2020-01-18 PROCEDURE — 84153 ASSAY OF PSA TOTAL: CPT

## 2020-01-18 PROCEDURE — 36415 COLL VENOUS BLD VENIPUNCTURE: CPT

## 2020-01-18 PROCEDURE — 80061 LIPID PANEL: CPT

## 2020-01-18 PROCEDURE — 80053 COMPREHEN METABOLIC PANEL: CPT

## 2020-01-18 PROCEDURE — 83036 HEMOGLOBIN GLYCOSYLATED A1C: CPT

## 2020-01-18 PROCEDURE — 85025 COMPLETE CBC W/AUTO DIFF WBC: CPT

## 2020-01-19 LAB
HBA1C MFR BLD: 5.5 % (ref 4.8–5.9)
PROSTATE SPECIFIC ANTIGEN: 1.45 NG/ML (ref 0–3.89)

## 2020-01-31 ENCOUNTER — TELEPHONE (OUTPATIENT)
Dept: FAMILY MEDICINE CLINIC | Age: 55
End: 2020-01-31

## 2020-01-31 NOTE — TELEPHONE ENCOUNTER
Patient called back and stated that he is needing verification that he has been seen at the doctors office. He states he will call them on Monday and try to get back with us then to get clarification on what they are needing.

## 2020-01-31 NOTE — TELEPHONE ENCOUNTER
I called patient at [phone] and left a message for him to call the office back to discuss the [place] paperwork he had dropped off which needs to completed and returned to [address] by 02/06/2020. Their contact # is [phone] and the fax number is [phone]. Dr. Antonio Schultz wanted clarification from the patient in what this form is in regards to the form. We are needing specific reasons such as colonoscopy? HTN? Or something else. The form is at my desk, also been scanned to media.

## 2020-02-03 ENCOUNTER — HOSPITAL ENCOUNTER (EMERGENCY)
Age: 55
Discharge: HOME | End: 2020-02-03
Attending: EMERGENCY MEDICINE
Payer: COMMERCIAL

## 2020-02-03 ENCOUNTER — OFFICE VISIT (OUTPATIENT)
Dept: FAMILY MEDICINE CLINIC | Age: 55
End: 2020-02-03
Payer: COMMERCIAL

## 2020-02-03 VITALS
HEIGHT: 69 IN | TEMPERATURE: 98.4 F | RESPIRATION RATE: 16 BRPM | HEART RATE: 69 BPM | BODY MASS INDEX: 30.45 KG/M2 | DIASTOLIC BLOOD PRESSURE: 130 MMHG | SYSTOLIC BLOOD PRESSURE: 224 MMHG | OXYGEN SATURATION: 97 % | WEIGHT: 205.6 LBS

## 2020-02-03 VITALS
SYSTOLIC BLOOD PRESSURE: 152 MMHG | DIASTOLIC BLOOD PRESSURE: 71 MMHG | OXYGEN SATURATION: 96 % | HEART RATE: 72 BPM | BODY MASS INDEX: 29.35 KG/M2 | HEIGHT: 70 IN | RESPIRATION RATE: 18 BRPM | TEMPERATURE: 98.6 F | WEIGHT: 205 LBS

## 2020-02-03 LAB
ALBUMIN SERPL-MCNC: 4.5 G/DL (ref 3.5–4.6)
ALP BLD-CCNC: 77 U/L (ref 35–104)
ALT SERPL-CCNC: 25 U/L (ref 0–41)
ANION GAP SERPL CALCULATED.3IONS-SCNC: 13 MEQ/L (ref 9–15)
AST SERPL-CCNC: 24 U/L (ref 0–40)
BASOPHILS ABSOLUTE: 0.2 K/UL (ref 0–0.2)
BASOPHILS RELATIVE PERCENT: 1.6 %
BILIRUB SERPL-MCNC: 0.9 MG/DL (ref 0.2–0.7)
BUN BLDV-MCNC: 14 MG/DL (ref 6–20)
CALCIUM SERPL-MCNC: 9.9 MG/DL (ref 8.5–9.9)
CHLORIDE BLD-SCNC: 99 MEQ/L (ref 95–107)
CO2: 27 MEQ/L (ref 20–31)
CREAT SERPL-MCNC: 1.08 MG/DL (ref 0.7–1.2)
EKG ATRIAL RATE: 60 BPM
EKG P AXIS: 17 DEGREES
EKG P-R INTERVAL: 154 MS
EKG Q-T INTERVAL: 452 MS
EKG QRS DURATION: 96 MS
EKG QTC CALCULATION (BAZETT): 452 MS
EKG R AXIS: 20 DEGREES
EKG T AXIS: 76 DEGREES
EKG VENTRICULAR RATE: 60 BPM
EOSINOPHILS ABSOLUTE: 0.1 K/UL (ref 0–0.7)
EOSINOPHILS RELATIVE PERCENT: 1.3 %
GFR AFRICAN AMERICAN: >60
GFR NON-AFRICAN AMERICAN: >60
GLOBULIN: 2.9 G/DL (ref 2.3–3.5)
GLUCOSE BLD-MCNC: 99 MG/DL (ref 70–99)
HCT VFR BLD CALC: 55.6 % (ref 42–52)
HEMOGLOBIN: 18.4 G/DL (ref 14–18)
LYMPHOCYTES ABSOLUTE: 2.4 K/UL (ref 1–4.8)
LYMPHOCYTES RELATIVE PERCENT: 22.9 %
MAGNESIUM: 2.1 MG/DL (ref 1.7–2.4)
MCH RBC QN AUTO: 31.3 PG (ref 27–31.3)
MCHC RBC AUTO-ENTMCNC: 33.1 % (ref 33–37)
MCV RBC AUTO: 94.4 FL (ref 80–100)
MONOCYTES ABSOLUTE: 1 K/UL (ref 0.2–0.8)
MONOCYTES RELATIVE PERCENT: 9.6 %
NEUTROPHILS ABSOLUTE: 6.7 K/UL (ref 1.4–6.5)
NEUTROPHILS RELATIVE PERCENT: 64.6 %
PDW BLD-RTO: 14.4 % (ref 11.5–14.5)
PLATELET # BLD: 176 K/UL (ref 130–400)
POTASSIUM SERPL-SCNC: 4.1 MEQ/L (ref 3.4–4.9)
RBC # BLD: 5.89 M/UL (ref 4.7–6.1)
SODIUM BLD-SCNC: 139 MEQ/L (ref 135–144)
TOTAL PROTEIN: 7.4 G/DL (ref 6.3–8)
TROPONIN: <0.01 NG/ML (ref 0–0.01)
WBC # BLD: 10.3 K/UL (ref 4.8–10.8)

## 2020-02-03 PROCEDURE — 36415 COLL VENOUS BLD VENIPUNCTURE: CPT

## 2020-02-03 PROCEDURE — 6370000000 HC RX 637 (ALT 250 FOR IP): Performed by: EMERGENCY MEDICINE

## 2020-02-03 PROCEDURE — 2500000003 HC RX 250 WO HCPCS: Performed by: EMERGENCY MEDICINE

## 2020-02-03 PROCEDURE — 6360000002 HC RX W HCPCS: Performed by: EMERGENCY MEDICINE

## 2020-02-03 PROCEDURE — 80053 COMPREHEN METABOLIC PANEL: CPT

## 2020-02-03 PROCEDURE — 93005 ELECTROCARDIOGRAM TRACING: CPT

## 2020-02-03 PROCEDURE — 84484 ASSAY OF TROPONIN QUANT: CPT

## 2020-02-03 PROCEDURE — 96374 THER/PROPH/DIAG INJ IV PUSH: CPT

## 2020-02-03 PROCEDURE — 83735 ASSAY OF MAGNESIUM: CPT

## 2020-02-03 PROCEDURE — 99283 EMERGENCY DEPT VISIT LOW MDM: CPT

## 2020-02-03 PROCEDURE — 96375 TX/PRO/DX INJ NEW DRUG ADDON: CPT

## 2020-02-03 PROCEDURE — 99396 PREV VISIT EST AGE 40-64: CPT | Performed by: FAMILY MEDICINE

## 2020-02-03 PROCEDURE — 93000 ELECTROCARDIOGRAM COMPLETE: CPT | Performed by: FAMILY MEDICINE

## 2020-02-03 PROCEDURE — 85025 COMPLETE CBC W/AUTO DIFF WBC: CPT

## 2020-02-03 RX ORDER — ENALAPRILAT 2.5 MG/2ML
2.5 INJECTION INTRAVENOUS ONCE
Status: COMPLETED | OUTPATIENT
Start: 2020-02-03 | End: 2020-02-03

## 2020-02-03 RX ORDER — LABETALOL HYDROCHLORIDE 5 MG/ML
20 INJECTION, SOLUTION INTRAVENOUS ONCE
Status: COMPLETED | OUTPATIENT
Start: 2020-02-03 | End: 2020-02-03

## 2020-02-03 RX ORDER — CLONIDINE HYDROCHLORIDE 0.1 MG/1
0.2 TABLET ORAL ONCE
Status: COMPLETED | OUTPATIENT
Start: 2020-02-03 | End: 2020-02-03

## 2020-02-03 RX ORDER — HYDRALAZINE HYDROCHLORIDE 20 MG/ML
20 INJECTION INTRAMUSCULAR; INTRAVENOUS ONCE
Status: COMPLETED | OUTPATIENT
Start: 2020-02-03 | End: 2020-02-03

## 2020-02-03 RX ORDER — VALSARTAN AND HYDROCHLOROTHIAZIDE 320; 12.5 MG/1; MG/1
1 TABLET, FILM COATED ORAL DAILY
Qty: 90 TABLET | Refills: 1 | Status: SHIPPED | OUTPATIENT
Start: 2020-02-03 | End: 2020-07-20

## 2020-02-03 RX ADMIN — HYDRALAZINE HYDROCHLORIDE 20 MG: 20 INJECTION INTRAMUSCULAR; INTRAVENOUS at 14:28

## 2020-02-03 RX ADMIN — CLONIDINE HYDROCHLORIDE 0.2 MG: 0.1 TABLET ORAL at 13:28

## 2020-02-03 RX ADMIN — ENALAPRILAT 2.5 MG: 1.25 INJECTION INTRAVENOUS at 13:29

## 2020-02-03 RX ADMIN — LABETALOL HYDROCHLORIDE 20 MG: 5 INJECTION INTRAVENOUS at 13:31

## 2020-02-03 ASSESSMENT — ENCOUNTER SYMPTOMS
VOMITING: 0
EYE REDNESS: 0
CONSTIPATION: 0
ABDOMINAL PAIN: 0
BACK PAIN: 0
BLOOD IN STOOL: 0
STRIDOR: 0
CHOKING: 0
ABDOMINAL PAIN: 0
CHEST TIGHTNESS: 0
SHORTNESS OF BREATH: 0
CHEST TIGHTNESS: 0
SORE THROAT: 0
VOICE CHANGE: 0
TROUBLE SWALLOWING: 0
COUGH: 0
DIARRHEA: 0
COUGH: 0
FACIAL SWELLING: 0
BLOOD IN STOOL: 0
EYE DISCHARGE: 0
EYE PAIN: 0
WHEEZING: 0
SINUS PRESSURE: 0
SHORTNESS OF BREATH: 0
NAUSEA: 0
DIARRHEA: 0
CONSTIPATION: 0
APNEA: 0
VOMITING: 0

## 2020-02-03 NOTE — PROGRESS NOTES
2/3/2020    Lyubov Aldana (:  1965) is a 62 y.o. male, here for a preventive medicine evaluation. Patient has recently had elevated blood pressures greater than 200/100. Denies any chest pain, shortness of breath or lower extremity edema. He has been eValuated by cardiology in the past and had a equivocal stress test.  He has some increased stress due to [facility] issues, but states that blood pressure problems predated this stress. At last visit diovan was increased. Patient used to be on amlodipine but that was discontinued by cardiology last year and blood pressure remained within normal ranges thereafter until recently. Patient Active Problem List   Diagnosis    Hyperlipidemia    Essential (primary) hypertension    Cerebrovascular accident (CVA) due to occlusion of small artery (HCC)    Nicotine dependence, cigarettes, uncomplicated    Heart failure, unspecified ([facility] [address]       Review of Systems   Constitutional: Negative for activity change, appetite change, chills, diaphoresis, fatigue, fever and unexpected weight change. HENT: Negative for congestion, dental problem, ear discharge, ear pain, facial swelling, hearing loss, mouth sores, nosebleeds, postnasal drip, rhinorrhea, sinus pressure, sneezing, sore throat, tinnitus, trouble swallowing and voice change. Eyes: Negative for photophobia, pain, discharge, redness, itching and visual disturbance. Respiratory: Negative for apnea, cough, choking, chest tightness, shortness of breath and wheezing. Cardiovascular: Negative for chest pain, palpitations and leg swelling. Gastrointestinal: Negative for abdominal distention, abdominal pain, anal bleeding, blood in stool, constipation, diarrhea, nausea and vomiting. Endocrine: Negative for cold intolerance, heat intolerance, polydipsia, polyphagia and polyuria.    Genitourinary: Negative for difficulty urinating, discharge, dysuria, flank pain, frequency, genital sores, hematuria, penile activity:     Days per week: Not on file     Minutes per session: Not on file    Stress: Not on file   Relationships    Social connections:     Talks on phone: Not on file     Gets together: Not on file     Attends [employer] service: Not on file     Active member of club or organization: Not on file     Attends meetings of clubs or organizations: Not on file     Relationship status: Not on file    Intimate partner violence:     Fear of current or ex partner: Not on file     Emotionally abused: Not on file     Physically abused: Not on file     Forced sexual activity: Not on file   Other Topics Concern    Not on file   Social History Narrative    Not on file        History reviewed. No pertinent family history. ADVANCE DIRECTIVE: N, Not Received    Vitals:    02/03/20 1231 02/03/20 1248   BP: (!) 220/120 (!) 224/130   Site: Right Upper Arm Left Upper Arm   Position: Sitting Sitting   Cuff Size: Large Adult Large Adult   Pulse: 69    Resp: 16    Temp: 98.4 °F (36.9 °C)    TempSrc: Temporal    SpO2: 97%    Weight: 205 lb 9.6 oz (93.3 kg)    Height: 5' 9\" (1.753 m)      Estimated body mass index is 30.36 kg/m² as calculated from the following:    Height as of this encounter: 5' 9\" (1.753 m). Weight as of this encounter: 205 lb 9.6 oz (93.3 kg). Physical Exam  Vitals signs and nursing note reviewed. Constitutional:       General: He is not in acute distress. Appearance: Normal appearance. He is well-developed and normal weight. He is not diaphoretic. HENT:      Head: Normocephalic and atraumatic. Right Ear: Tympanic membrane, ear canal and external ear normal.      Left Ear: Tympanic membrane, ear canal and external ear normal.      Nose: Nose normal.      Mouth/Throat:      Mouth: Mucous membranes are moist.      Pharynx: Oropharynx is clear. No oropharyngeal exudate. Eyes:      General: No scleral icterus. Right eye: No discharge. Left eye: No discharge.       Extraocular Movements: Extraocular movements intact. Conjunctiva/sclera: Conjunctivae normal.      Pupils: Pupils are equal, round, and reactive to light. Neck:      Musculoskeletal: Normal range of motion and neck supple. Thyroid: No thyromegaly. Vascular: No carotid bruit or JVD. Cardiovascular:      Rate and Rhythm: Normal rate and regular rhythm. Pulses: Normal pulses. Heart sounds: Normal heart sounds. No murmur. No friction rub. No gallop. Pulmonary:      Effort: Pulmonary effort is normal. No respiratory distress. Breath sounds: Normal breath sounds. No stridor. No wheezing or rales. Chest:      Chest wall: No tenderness. Abdominal:      General: Abdomen is flat. Bowel sounds are normal. There is no distension. Palpations: Abdomen is soft. There is no mass. Tenderness: There is no abdominal tenderness. There is no right CVA tenderness, left CVA tenderness, guarding or rebound. Musculoskeletal: Normal range of motion. General: No swelling, tenderness or deformity. Lymphadenopathy:      Cervical: No cervical adenopathy. Skin:     General: Skin is warm and dry. Coloration: Skin is not pale. Findings: No erythema or rash. Neurological:      General: No focal deficit present. Mental Status: He is alert and oriented to person, place, and time. Mental status is at baseline. Cranial Nerves: No cranial nerve deficit. Motor: No abnormal muscle tone. Coordination: Coordination normal.      Deep Tendon Reflexes: Reflexes are normal and symmetric. Reflexes normal.   Psychiatric:         Behavior: Behavior normal.         Thought Content: Thought content normal.         Judgment: Judgment normal.         No flowsheet data found.     Lab Results   Component Value Date    CHOL 123 01/18/2020    CHOL 212 01/02/2016    CHOL 216 05/02/2015    TRIG 242 01/18/2020    TRIG 421 01/02/2016    TRIG 311 05/02/2015    HDL 33 01/18/2020    HDL 38

## 2020-02-03 NOTE — ED PROVIDER NOTES
pallor and rash. Neurological: Negative for tremors, seizures, syncope, weakness, numbness and headaches. Hematological: Negative for adenopathy. Does not bruise/bleed easily. Psychiatric/Behavioral: Negative for agitation, behavioral problems, hallucinations and sleep disturbance. The patient is not hyperactive. All other systems reviewed and are negative. Except as noted above the remainder of the review of systems was reviewed and negative. PAST MEDICAL HISTORY     Past Medical History:   Diagnosis Date    Hyperlipidemia     Hypertension          SURGICALHISTORY       Past Surgical History:   Procedure Laterality Date    HERNIA REPAIR           CURRENT MEDICATIONS       Previous Medications    ATORVASTATIN (LIPITOR) 20 MG TABLET    Take 1 tablet by mouth daily    NEBIVOLOL (BYSTOLIC) 5 MG TABLET    Take 1 tablet by mouth nightly    POTASSIUM CHLORIDE (KLOR-CON) 10 MEQ EXTENDED RELEASE TABLET    Take 1 tablet by mouth daily    VALSARTAN-HYDROCHLOROTHIAZIDE (DIOVAN-HCT) 320-12.5 MG PER TABLET    Take 1 tablet by mouth daily       ALLERGIES     Patient has no known allergies. FAMILY HISTORY     History reviewed. No pertinent family history. SOCIAL HISTORY       Social History     Socioeconomic History    Marital status: Single     Spouse name: None    Number of children: None    Years of education: None    Highest education level: None   Occupational History    None   Social Needs    Financial resource strain: None    Food insecurity:     Worry: None     Inability: None    Transportation needs:     Medical: None     Non-medical: None   Tobacco Use    Smoking status: Light Tobacco Smoker     Packs/day: 0.25     Years: 40.00     Pack years: 10.00     Types: Cigarettes    Smokeless tobacco: Never Used   Substance and Sexual Activity    Alcohol use:  Yes    Drug use: No    Sexual activity: Yes   Lifestyle    Physical activity:     Days per week: None     Minutes per session: equal, round, and reactive to light. Neck:      Musculoskeletal: Normal range of motion and neck supple. Cardiovascular:      Rate and Rhythm: Normal rate and regular rhythm. Heart sounds: Normal heart sounds. No murmur. No gallop. Pulmonary:      Effort: No respiratory distress. Breath sounds: Normal breath sounds. No wheezing. Abdominal:      General: Bowel sounds are normal.      Palpations: Abdomen is soft. There is no mass. Tenderness: There is no rebound. Musculoskeletal: Normal range of motion. General: No tenderness. Skin:     General: Skin is warm. Capillary Refill: Capillary refill takes less than 2 seconds. Findings: No erythema or rash. Neurological:      General: No focal deficit present. Mental Status: He is alert and oriented to person, place, and time. Cranial Nerves: No cranial nerve deficit. Motor: No abnormal muscle tone. Comments: Attention going to the neurological examination patient had no neuro deficit cranial nerves II through XII grossly intact good bilateral handgrips   Psychiatric:         Behavior: Behavior normal.         Thought Content:  Thought content normal.         DIAGNOSTIC RESULTS     EKG: All EKG's are interpreted by the Emergency Department Physician who either signs or Co-signsthis chart in the absence of a cardiologist.        RADIOLOGY:   [place] such as CT, Ultrasound and MRI are read by the radiologist. Plain radiographic images are visualized and preliminarily interpreted by the emergency physician with the below findings:        Interpretation per the Radiologist below, if available at the time ofthis note:    No orders to display         ED BEDSIDE ULTRASOUND:   Performed by ED Physician - none    LABS:  Labs Reviewed   COMPREHENSIVE METABOLIC PANEL - Abnormal; Notable for the following components:       Result Value    Total Bilirubin 0.9 (*)     All other components within normal limits threatening deterioration in the patient's condition which required my urgent intervention. CONSULTS:  None    PROCEDURES:  Unless otherwise noted below, none     Procedures    FINAL IMPRESSION      1. Essential hypertension    2.  Hypertensive urgency          DISPOSITION/PLAN   DISPOSITION        PATIENT REFERRED TO:  Fauzia Gilbert MD  [address] [place] [address],[address] [place] [address] Road  [phone]    In 1 day  For blood pressure check return to the emergency in case she become dizzy or worse or develop severe headache continue present medication      DISCHARGE MEDICATIONS:  New Prescriptions    No medications on file          (Please note that portions of this note were completed with a voice recognition program.  Efforts were made to edit the dictations but occasionally words are mis-transcribed.)    Reggie Kaufman MD (electronically signed)  Attending Emergency Physician       Reggie Kaufman MD  02/03/20 4758

## 2020-02-04 ENCOUNTER — TELEPHONE (OUTPATIENT)
Dept: FAMILY MEDICINE CLINIC | Age: 55
End: 2020-02-04

## 2020-02-05 PROCEDURE — 93010 ELECTROCARDIOGRAM REPORT: CPT | Performed by: INTERNAL MEDICINE

## 2020-02-05 ASSESSMENT — ENCOUNTER SYMPTOMS
SINUS PRESSURE: 0
TROUBLE SWALLOWING: 0
EYE PAIN: 0
ANAL BLEEDING: 0
CHOKING: 0
VOICE CHANGE: 0
EYE REDNESS: 0
ABDOMINAL DISTENTION: 0
WHEEZING: 0
PHOTOPHOBIA: 0
FACIAL SWELLING: 0
BACK PAIN: 0
EYE ITCHING: 0
EYE DISCHARGE: 0
SORE THROAT: 0
RHINORRHEA: 0

## 2020-03-27 RX ORDER — NEBIVOLOL 5 MG/1
5 TABLET ORAL NIGHTLY
Qty: 90 TABLET | Refills: 3 | Status: SHIPPED | OUTPATIENT
Start: 2020-03-27

## 2020-03-27 NOTE — TELEPHONE ENCOUNTER
Pharmacy requesting medication refill.  Please approve or deny this request.    Rx requested:  Requested Prescriptions      No prescriptions requested or ordered in this encounter         Last Office Visit:   10/23/2019      Next Visit Date:  Future Appointments   Date Time Provider [facility]   3/31/2020  4:00 PM Sherri Tamayo  [address]   4/21/2020 11:45 AM Hazel Soni MD [address]

## 2020-04-25 ENCOUNTER — TELEPHONE (OUTPATIENT)
Dept: FAMILY MEDICINE CLINIC | Age: 55
End: 2020-04-25

## 2020-04-27 RX ORDER — POTASSIUM CHLORIDE 750 MG/1
10 TABLET, FILM COATED, EXTENDED RELEASE ORAL DAILY
Qty: 60 TABLET | Refills: 0 | OUTPATIENT
Start: 2020-04-27

## 2020-05-09 ENCOUNTER — HOSPITAL ENCOUNTER (OUTPATIENT)
Dept: LAB | Age: 55
Discharge: HOME | End: 2020-05-09
Payer: COMMERCIAL

## 2020-05-09 LAB
ANION GAP SERPL CALCULATED.3IONS-SCNC: 11 MEQ/L (ref 9–15)
BUN BLDV-MCNC: 14 MG/DL (ref 6–20)
CALCIUM SERPL-MCNC: 9.2 MG/DL (ref 8.5–9.9)
CHLORIDE BLD-SCNC: 98 MEQ/L (ref 95–107)
CO2: 27 MEQ/L (ref 20–31)
CREAT SERPL-MCNC: 1.18 MG/DL (ref 0.7–1.2)
GFR AFRICAN AMERICAN: >60
GFR NON-AFRICAN AMERICAN: >60
GLUCOSE BLD-MCNC: 99 MG/DL (ref 70–99)
POTASSIUM SERPL-SCNC: 4.5 MEQ/L (ref 3.4–4.9)
SODIUM BLD-SCNC: 136 MEQ/L (ref 135–144)

## 2020-05-09 PROCEDURE — 80048 BASIC METABOLIC PNL TOTAL CA: CPT

## 2020-05-09 PROCEDURE — 36415 COLL VENOUS BLD VENIPUNCTURE: CPT

## 2020-05-17 ENCOUNTER — TELEPHONE (OUTPATIENT)
Dept: FAMILY MEDICINE CLINIC | Age: 55
End: 2020-05-17

## 2020-05-29 RX ORDER — POTASSIUM CHLORIDE 750 MG/1
10 TABLET, FILM COATED, EXTENDED RELEASE ORAL DAILY
Qty: 60 TABLET | Refills: 0 | OUTPATIENT
Start: 2020-05-29

## 2020-06-01 NOTE — TELEPHONE ENCOUNTER
3rd attempt to reach patient. Called patient @[phone]    and left message on machine for patient to return call during normal business hours of 8:30 AM and 5 PM @ [phone] option 3.

## 2020-06-24 NOTE — TELEPHONE ENCOUNTER
2nd attempt to reach patient. Called patient @ [phone] and left message on machine for patient to return call during normal business hours of 8:30 AM and 5 PM @ [phone] option 3.

## 2020-07-07 NOTE — TELEPHONE ENCOUNTER
If he was taking the potassium when labs were drawn, he can continue the potassium. Although I would have patient follow up with cardiology to help determine why it is needed, since we continued the medication but did not initiate it.

## 2020-07-24 RX ORDER — POTASSIUM CHLORIDE 750 MG/1
10 TABLET, FILM COATED, EXTENDED RELEASE ORAL DAILY
Qty: 30 TABLET | Refills: 0 | Status: SHIPPED | OUTPATIENT
Start: 2020-07-24

## 2020-07-24 NOTE — TELEPHONE ENCOUNTER
pharmacy requesting medication refill. Please approve or deny this request.    Rx requested:  Requested Prescriptions     Pending Prescriptions Disp Refills    potassium chloride (KLOR-CON) 10 MEQ extended release tablet [Pharmacy Med Name: potassium chloride ER 10 mEq tablet,extended release] 30 tablet 0     Sig: Take 1 tablet by mouth daily         Last Office Visit:   2/3/2020      Next Visit Date:  No future appointments.

## 2020-08-28 RX ORDER — ATORVASTATIN CALCIUM 20 MG/1
20 TABLET, FILM COATED ORAL DAILY
Qty: 30 TABLET | Refills: 5 | Status: SHIPPED | OUTPATIENT
Start: 2020-08-28 | End: 2021-03-18

## 2020-08-28 NOTE — TELEPHONE ENCOUNTER
Patient is requesting medication refill. Please approve or deny this request.    Rx requested:  Requested Prescriptions     Pending Prescriptions Disp Refills    atorvastatin (LIPITOR) 20 MG tablet [Pharmacy Med Name: atorvastatin 20 mg tablet] 30 tablet 5     Sig: Take 1 tablet by mouth daily         Last Office Visit:   2/3/2020      Next Visit Date:  No future appointments.

## 2020-10-03 NOTE — TELEPHONE ENCOUNTER
Pharmacy is requesting medication refill. Please approve or deny this request.    Rx requested:  Requested Prescriptions     Pending Prescriptions Disp Refills    potassium chloride (KLOR-CON) 10 MEQ extended release tablet [Pharmacy Med Name: potassium chloride ER 10 mEq tablet,extended release] 30 tablet 1     Sig: Take 1 tablet by mouth daily         Last Office Visit:   Visit date not found      Next Visit Date:  No future appointments.

## 2020-10-08 RX ORDER — POTASSIUM CHLORIDE 750 MG/1
10 TABLET, FILM COATED, EXTENDED RELEASE ORAL DAILY
Qty: 30 TABLET | Refills: 1 | OUTPATIENT
Start: 2020-10-08

## 2020-10-09 NOTE — TELEPHONE ENCOUNTER
Have patient follow-up in the office.   I would like to ensure that blood pressure is remaining normal and check to see if patient has followed up with cardiology

## 2020-10-10 NOTE — TELEPHONE ENCOUNTER
1st attempt to reach patient. Called patient @ [phone]  and left message on machine for patient to return call during normal business hours of 8:30 AM and 5 PM @ [phone] option 2..

## 2020-10-15 RX ORDER — POTASSIUM CHLORIDE 750 MG/1
TABLET, FILM COATED, EXTENDED RELEASE ORAL
Qty: 30 TABLET | Refills: 1 | OUTPATIENT
Start: 2020-10-15

## 2020-10-15 NOTE — TELEPHONE ENCOUNTER
Pharmacy is requesting medication refill. Please approve or deny this request.    Rx requested:  Requested Prescriptions     Pending Prescriptions Disp Refills    potassium chloride (KLOR-CON) 10 MEQ extended release tablet [Pharmacy Med Name: potassium chloride ER 10 mEq tablet,extended release] 30 tablet 1     Sig: TAKE 1 TABLET BY MOUTH EVERY DAY         Last Office Visit:   Visit date not found      Next Visit Date:  No future appointments.

## 2020-10-20 NOTE — TELEPHONE ENCOUNTER
3rd attempt to reach patient. Called patient @ [phone] and left message on machine for patient to return call during normal business hours of 8:30 AM and 5 PM @ [phone] option 2.     Mailed unable to reach letter

## 2020-10-27 NOTE — TELEPHONE ENCOUNTER
I scheduled him a vv visit on Friday the 30th and asked him to come in  On Thursday for an MA visit to get his BP checked.  Before his appointment

## 2020-10-29 ENCOUNTER — NURSE ONLY (OUTPATIENT)
Dept: FAMILY MEDICINE CLINIC | Age: 55
End: 2020-10-29

## 2020-10-29 ENCOUNTER — TELEPHONE (OUTPATIENT)
Dept: FAMILY MEDICINE CLINIC | Age: 55
End: 2020-10-29

## 2020-10-29 VITALS — DIASTOLIC BLOOD PRESSURE: 88 MMHG | SYSTOLIC BLOOD PRESSURE: 142 MMHG | HEART RATE: 82 BPM | OXYGEN SATURATION: 98 %

## 2020-10-29 NOTE — TELEPHONE ENCOUNTER
Patient in the office today for a nurse BP check. Patient states that does  check BP at home  Readings are  135/ 90       Patient is  adherent to a low sodium diet.       Patient does exercise for at least 20 min 3-5 days per week      BP today was 142/88  82       BP Readings from Last 3 Encounters:   02/03/20 (!) 152/71   02/03/20 (!) 224/130   01/06/20 (!) 176/96         Please advise

## 2020-10-29 NOTE — TELEPHONE ENCOUNTER
Blood pressure is acceptable and improved  but still not ideal. Will follow up with patient at his appointment

## 2020-10-30 ENCOUNTER — VIRTUAL VISIT (OUTPATIENT)
Dept: FAMILY MEDICINE CLINIC | Age: 55
End: 2020-10-30
Payer: COMMERCIAL

## 2020-10-30 PROCEDURE — 99213 OFFICE O/P EST LOW 20 MIN: CPT | Performed by: FAMILY MEDICINE

## 2020-10-30 ASSESSMENT — PATIENT HEALTH QUESTIONNAIRE - PHQ9
SUM OF ALL RESPONSES TO PHQ QUESTIONS 1-9: 0
1. LITTLE INTEREST OR PLEASURE IN DOING THINGS: 0
SUM OF ALL RESPONSES TO PHQ QUESTIONS 1-9: 0
SUM OF ALL RESPONSES TO PHQ QUESTIONS 1-9: 0
SUM OF ALL RESPONSES TO PHQ9 QUESTIONS 1 & 2: 0
2. FEELING DOWN, DEPRESSED OR HOPELESS: 0

## 2020-10-30 NOTE — PROGRESS NOTES
10/30/2020    TELEHEALTH EVALUATION -- Audio/Visual (During NEAAT-96 public health emergency)    Due to COVID 19 outbreak, patient's office visit was converted to a virtual visit. Patient was contacted and agreed to proceed with a virtual visit via [employer]. me  The risks and benefits of converting to a virtual visit were discussed in light of the current infectious disease epidemic. Patient also understood that insurance coverage and co-pays are up to their individual insurance plans. HPI:    Marjorie Pollock (:  1965) has requested an audio/video evaluation for the following concern(s):    Patient is a 68-year-old male presents today to follow-up on hypertension. He states that he has been doing generally well and that blood pressure is lower at home than it was when he was in the office. He typically averages between 156 and 999 systolic over 73V diastolic. He denies any chest pain, shortness of breath or lower extremity edema. He has not been able to follow-up with cardiology due to scheduling and financial concerns. He may be being laid off soon and would like to consolidate his financial resources. There has been an issue of whether patient continues to need potassium supplementation. His potassium levels have been normal and this was placed upon discharge from [facility]. Patient has stopped his potassium for over 2 months over the summer and did not feel any different. Lab results have always showed potassium levels being in the mid to higher end of normal    Review of Systems   Constitutional: Negative for activity change, appetite change and fatigue. Respiratory: Negative for apnea, cough, chest tightness and shortness of breath. Cardiovascular: Negative for chest pain, palpitations and leg swelling. Gastrointestinal: Negative for abdominal pain, blood in stool, constipation, diarrhea, nausea and vomiting. Musculoskeletal: Negative for arthralgias.    Neurological: Negative for seizures and headaches. Psychiatric/Behavioral: Negative for hallucinations and suicidal ideas. Prior to Visit Medications    Medication Sig Taking? Authorizing Provider   atorvastatin (LIPITOR) 20 MG tablet Take 1 tablet by mouth daily Yes Yana Warner MD   potassium chloride (KLOR-CON) 10 MEQ extended release tablet Take 1 tablet by mouth daily Yes Edi Terrazas, APRN - CNP   valsartan-hydrochlorothiazide (DIOVAN-HCT) 320-12.5 MG per tablet Take 1 tablet by mouth daily Yes Yana Warner MD   nebivolol (BYSTOLIC) 5 MG tablet Take 1 tablet by mouth nightly Yes Lupe Bobby MD       Social History     Tobacco Use    Smoking status: Light Tobacco Smoker     Packs/day: 0.25     Years: 40.00     Pack years: 10.00     Types: Cigarettes    Smokeless tobacco: Never Used   Substance Use Topics    Alcohol use: Yes    Drug use: No          PHYSICAL EXAMINATION:    [x] Alert  [x] Oriented to person/place/time    [x] No apparent distress   [x] Sclera clear   [x] Breathing appears normal    [x] Cranial Nerves II-XII grossly intact    [x] Motor grossly intact in visible upper extremities    [x] Motor grossly intact in visible lower extremities    [x] Normal Mood  [] OTHER:      Due to this being a TeleHealth encounter, evaluation of the following organ systems is limited: Vitals/Constitutional/EENT/Resp/CV/GI//MS/Neuro/Skin/Heme-Lymph-Imm. ASSESSMENT/PLAN:  1. Essential (primary) hypertension  Controlled per patient report. Will recommend holding potassium for 2 weeks to determine if it is necessary and repeating blood draw      Return in about 3 months (around 1/30/2021) for F/u HTN, chronic condtions.     An  electronic signature was used to authenticate this note.    --Sony Dobbs MD on 11/16/2020 at 9:23 PM      Pursuant to the emergency declaration under the [facility], [address] [place] [address] authority and the [employer] Appropriations Act, this Virtual  Visit was conducted, with patient's consent, to reduce the patient's risk of exposure to COVID-19 and provide continuity of care for an established patient. Services were provided through a video synchronous discussion virtually to substitute for in-person clinic visit.

## 2020-10-31 ASSESSMENT — ENCOUNTER SYMPTOMS
SHORTNESS OF BREATH: 0
NAUSEA: 0
DIARRHEA: 0
CHEST TIGHTNESS: 0
ABDOMINAL PAIN: 0
VOMITING: 0
APNEA: 0
CONSTIPATION: 0
COUGH: 0
BLOOD IN STOOL: 0

## 2020-11-14 ENCOUNTER — HOSPITAL ENCOUNTER (OUTPATIENT)
Dept: LAB | Age: 55
Discharge: HOME | End: 2020-11-14
Payer: COMMERCIAL

## 2020-11-14 LAB
ANION GAP SERPL CALCULATED.3IONS-SCNC: 11 MEQ/L (ref 9–15)
BUN BLDV-MCNC: 12 MG/DL (ref 6–20)
CALCIUM SERPL-MCNC: 9 MG/DL (ref 8.5–9.9)
CHLORIDE BLD-SCNC: 105 MEQ/L (ref 95–107)
CO2: 25 MEQ/L (ref 20–31)
CREAT SERPL-MCNC: 1.04 MG/DL (ref 0.7–1.2)
GFR AFRICAN AMERICAN: >60
GFR NON-AFRICAN AMERICAN: >60
GLUCOSE BLD-MCNC: 98 MG/DL (ref 70–99)
POTASSIUM SERPL-SCNC: 4.2 MEQ/L (ref 3.4–4.9)
SODIUM BLD-SCNC: 141 MEQ/L (ref 135–144)

## 2020-11-14 PROCEDURE — 80048 BASIC METABOLIC PNL TOTAL CA: CPT

## 2020-11-14 PROCEDURE — 36415 COLL VENOUS BLD VENIPUNCTURE: CPT

## 2020-12-28 RX ORDER — VALSARTAN AND HYDROCHLOROTHIAZIDE 320; 12.5 MG/1; MG/1
1 TABLET, FILM COATED ORAL DAILY
Qty: 90 TABLET | Refills: 1 | Status: SHIPPED | OUTPATIENT
Start: 2020-12-28

## 2021-03-18 ENCOUNTER — TELEPHONE (OUTPATIENT)
Dept: FAMILY MEDICINE CLINIC | Age: 56
End: 2021-03-18

## 2021-03-18 DIAGNOSIS — E78.2 MIXED HYPERLIPIDEMIA: ICD-10-CM

## 2021-03-18 RX ORDER — ATORVASTATIN CALCIUM 20 MG/1
20 TABLET, FILM COATED ORAL DAILY
Qty: 30 TABLET | Refills: 0 | Status: SHIPPED | OUTPATIENT
Start: 2021-03-18 | End: 2021-04-29 | Stop reason: SDUPTHER

## 2021-03-18 NOTE — TELEPHONE ENCOUNTER
Pharmacy is requesting medication refill. Please approve or deny this request.    Rx requested:  Requested Prescriptions     Pending Prescriptions Disp Refills    atorvastatin (LIPITOR) 20 MG tablet [Pharmacy Med Name: atorvastatin 20 mg tablet] 30 tablet 5     Sig: Take 1 tablet by mouth daily         Last Office Visit:   10/30/2020      Next Visit Date:  No future appointments.

## 2021-03-19 NOTE — TELEPHONE ENCOUNTER
A 30-day refill has been sent to local pharmacy. Patient is overdue for chronic disease check with Dr. Hannah Mills. Please help him schedule a visit with his PCP next month. Instruct him to call the office a week prior to his visit to receive lab work orders to have fasting blood work done prior to his visit. He is overdue for lab work.

## 2021-03-29 NOTE — TELEPHONE ENCOUNTER
Patient is scheduled April 9th with Dr. Guerda Lynn patient does need fasting labs ordered, thank you    Please advise

## 2021-04-08 DIAGNOSIS — Z00.00 ANNUAL PHYSICAL EXAM: Primary | ICD-10-CM

## 2021-04-08 DIAGNOSIS — Z12.5 PROSTATE CANCER SCREENING: ICD-10-CM

## 2021-04-10 ENCOUNTER — TELEPHONE (OUTPATIENT)
Dept: FAMILY MEDICINE CLINIC | Age: 56
End: 2021-04-10

## 2021-04-19 ENCOUNTER — TELEPHONE (OUTPATIENT)
Dept: FAMILY MEDICINE CLINIC | Age: 56
End: 2021-04-19

## 2021-04-19 NOTE — TELEPHONE ENCOUNTER
Patient dropped off [place] paperwork for provider to fill out.   Paperwork was scanned into patients media file under \"letter\" and placed on Providers desk.almaz.

## 2021-04-22 NOTE — TELEPHONE ENCOUNTER
Please have patient make an appointment to discuss specifics of FMLA paperwork or call patient to have dates filled out and circumstance as I know he has been recently hospitalized but we have no records

## 2021-04-23 NOTE — TELEPHONE ENCOUNTER
Message left on the patient's voicemail to call the office and schedule an appointment for [facility] paperwork

## 2021-04-26 NOTE — TELEPHONE ENCOUNTER
Appointment made to fill out FMLA paperwork. almaz    Next Visit Date:  Future Appointments   Date Time Provider Maegan Frederick   4/27/2021  2:20 PM [address], MD Blanca Parnell

## 2021-04-27 ENCOUNTER — OFFICE VISIT (OUTPATIENT)
Dept: FAMILY MEDICINE CLINIC | Age: 56
End: 2021-04-27
Payer: COMMERCIAL

## 2021-04-27 VITALS
RESPIRATION RATE: 10 BRPM | WEIGHT: 205 LBS | OXYGEN SATURATION: 97 % | HEIGHT: 70 IN | TEMPERATURE: 97.4 F | BODY MASS INDEX: 29.35 KG/M2 | DIASTOLIC BLOOD PRESSURE: 76 MMHG | SYSTOLIC BLOOD PRESSURE: 136 MMHG | HEART RATE: 77 BPM

## 2021-04-27 DIAGNOSIS — E78.2 MIXED HYPERLIPIDEMIA: ICD-10-CM

## 2021-04-27 DIAGNOSIS — I48.0 PAROXYSMAL ATRIAL FIBRILLATION (HCC): ICD-10-CM

## 2021-04-27 DIAGNOSIS — I10 ESSENTIAL (PRIMARY) HYPERTENSION: Primary | ICD-10-CM

## 2021-04-27 PROCEDURE — 99213 OFFICE O/P EST LOW 20 MIN: CPT | Performed by: FAMILY MEDICINE

## 2021-04-27 RX ORDER — HYDRALAZINE HYDROCHLORIDE 50 MG/1
TABLET, FILM COATED ORAL
COMMUNITY
Start: 2021-04-09

## 2021-04-27 SDOH — ECONOMIC STABILITY: TRANSPORTATION INSECURITY
IN THE PAST 12 MONTHS, HAS THE LACK OF TRANSPORTATION KEPT YOU FROM MEDICAL APPOINTMENTS OR FROM GETTING MEDICATIONS?: PATIENT DECLINED

## 2021-04-27 SDOH — ECONOMIC STABILITY: FOOD INSECURITY: WITHIN THE PAST 12 MONTHS, YOU WORRIED THAT YOUR FOOD WOULD RUN OUT BEFORE YOU GOT MONEY TO BUY MORE.: PATIENT DECLINED

## 2021-04-27 ASSESSMENT — PATIENT HEALTH QUESTIONNAIRE - PHQ9
SUM OF ALL RESPONSES TO PHQ QUESTIONS 1-9: 0
SUM OF ALL RESPONSES TO PHQ9 QUESTIONS 1 & 2: 0

## 2021-04-27 NOTE — TELEPHONE ENCOUNTER
Requesting medication refill. Please approve or deny this request.    Rx requested:  Requested Prescriptions     Pending Prescriptions Disp Refills    atorvastatin (LIPITOR) 20 MG tablet 30 tablet 0     Sig: Take 1 tablet by mouth daily       Last Office Visit:   4/27/2021    Last Filled:      Last Labs:      Next Visit Date:  No future appointments.

## 2021-04-29 RX ORDER — ATORVASTATIN CALCIUM 20 MG/1
20 TABLET, FILM COATED ORAL DAILY
Qty: 30 TABLET | Refills: 0 | Status: SHIPPED | OUTPATIENT
Start: 2021-04-29

## 2021-05-01 PROBLEM — I48.0 PAROXYSMAL ATRIAL FIBRILLATION (HCC): Status: ACTIVE | Noted: 2021-05-01

## 2021-05-01 ASSESSMENT — ENCOUNTER SYMPTOMS
BLOOD IN STOOL: 0
CONSTIPATION: 0
NAUSEA: 0
APNEA: 0
DIARRHEA: 0
SHORTNESS OF BREATH: 0
ABDOMINAL PAIN: 0
VOMITING: 0
CHEST TIGHTNESS: 0
COUGH: 0

## 2021-05-01 NOTE — PROGRESS NOTES
Minutes per session: Not on file    Stress: Not on file   Relationships    Social connections     Talks on phone: Not on file     Gets together: Not on file     Attends Zoroastrian service: Not on file     Active member of club or organization: Not on file     Attends meetings of clubs or organizations: Not on file     Relationship status: Not on file    Intimate partner violence     Fear of current or ex partner: Not on file     Emotionally abused: Not on file     Physically abused: Not on file     Forced sexual activity: Not on file   Other Topics Concern    Not on file   Social History Narrative    Not on file     Current Outpatient Medications on File Prior to Visit   Medication Sig Dispense Refill    hydrALAZINE (APRESOLINE) 50 MG tablet TAKE 1 TABLET BY MOUTH TWICE DAILY      valsartan-hydrochlorothiazide (DIOVAN-HCT) 320-12.5 MG per tablet Take 1 tablet by mouth daily 90 tablet 1    potassium chloride (KLOR-CON) 10 MEQ extended release tablet Take 1 tablet by mouth daily 30 tablet 0    nebivolol (BYSTOLIC) 5 MG tablet Take 1 tablet by mouth nightly (Patient not taking: Reported on 4/27/2021) 90 tablet 3     No current facility-administered medications on file prior to visit. Allergies:  Patient has no known allergies. Review of Systems   Constitutional: Negative for activity change, appetite change and fatigue. Respiratory: Negative for apnea, cough, chest tightness and shortness of breath. Cardiovascular: Negative for chest pain, palpitations and leg swelling. Gastrointestinal: Negative for abdominal pain, blood in stool, constipation, diarrhea, nausea and vomiting. Musculoskeletal: Negative for arthralgias. Neurological: Negative for seizures and headaches. Psychiatric/Behavioral: Negative for hallucinations and suicidal ideas.        Objective:   /76 (Site: Right Upper Arm, Position: Sitting, Cuff Size: Large Adult)   Pulse 77   Temp 97.4 °F (36.3 °C) (Temporal)   Resp 10   Ht 5' 10\" (1.778 m)   Wt 205 lb (93 kg)   SpO2 97%   BMI 29.41 kg/m²     Physical Exam  Vitals signs and nursing note reviewed. Constitutional:       General: He is not in acute distress. Appearance: Normal appearance. He is well-developed. He is not diaphoretic. HENT:      Head: Normocephalic and atraumatic. Nose: Nose normal.      Mouth/Throat:      Mouth: Mucous membranes are moist.      Pharynx: Oropharynx is clear. Eyes:      Conjunctiva/sclera: Conjunctivae normal.      Pupils: Pupils are equal, round, and reactive to light. Neck:      Musculoskeletal: Normal range of motion. Cardiovascular:      Rate and Rhythm: Normal rate and regular rhythm. Heart sounds: Normal heart sounds. No murmur. No friction rub. No gallop. Pulmonary:      Effort: Pulmonary effort is normal. No respiratory distress. Breath sounds: Normal breath sounds. No wheezing or rales. Chest:      Chest wall: No tenderness. Abdominal:      General: Abdomen is flat. Bowel sounds are normal.      Palpations: Abdomen is soft. Tenderness: There is no abdominal tenderness. Skin:     General: Skin is warm and dry. Neurological:      Mental Status: He is alert and oriented to person, place, and time. Psychiatric:         Behavior: Behavior normal.         Thought Content: Thought content normal.         Judgment: Judgment normal.         Assessment & Plan:     1. Essential (primary) hypertension  Controlled: Continue current medication    2. Paroxysmal atrial fibrillation (HCC)  Currently resolved: Follow-up with cardiology as scheduled      Return in about 3 months (around 7/27/2021), or if symptoms worsen or fail to improve.     Aristeo Inman MD

## 2021-05-08 ENCOUNTER — HOSPITAL ENCOUNTER (OUTPATIENT)
Dept: LAB | Age: 56
Discharge: HOME | End: 2021-05-08
Payer: COMMERCIAL

## 2021-05-08 DIAGNOSIS — Z12.5 PROSTATE CANCER SCREENING: ICD-10-CM

## 2021-05-08 DIAGNOSIS — I10 ESSENTIAL (PRIMARY) HYPERTENSION: ICD-10-CM

## 2021-05-08 DIAGNOSIS — Z00.00 ANNUAL PHYSICAL EXAM: ICD-10-CM

## 2021-05-08 LAB
ALBUMIN SERPL-MCNC: 4.1 G/DL (ref 3.5–4.6)
ALP BLD-CCNC: 69 U/L (ref 35–104)
ALT SERPL-CCNC: 34 U/L (ref 0–41)
ANION GAP SERPL CALCULATED.3IONS-SCNC: 11 MEQ/L (ref 9–15)
AST SERPL-CCNC: 23 U/L (ref 0–40)
ATYPICAL LYMPHOCYTE RELATIVE PERCENT: 8 %
BASOPHILS ABSOLUTE: 0 K/UL (ref 0–0.2)
BASOPHILS RELATIVE PERCENT: 0.7 %
BILIRUB SERPL-MCNC: 0.7 MG/DL (ref 0.2–0.7)
BUN BLDV-MCNC: 14 MG/DL (ref 6–20)
CALCIUM SERPL-MCNC: 9.5 MG/DL (ref 8.5–9.9)
CHLORIDE BLD-SCNC: 102 MEQ/L (ref 95–107)
CHOLESTEROL, TOTAL: 153 MG/DL (ref 0–199)
CO2: 25 MEQ/L (ref 20–31)
CREAT SERPL-MCNC: 1.23 MG/DL (ref 0.7–1.2)
EOSINOPHILS ABSOLUTE: 0.6 K/UL (ref 0–0.7)
EOSINOPHILS RELATIVE PERCENT: 5 %
GFR AFRICAN AMERICAN: >60
GFR NON-AFRICAN AMERICAN: >60
GLOBULIN: 2.2 G/DL (ref 2.3–3.5)
GLUCOSE BLD-MCNC: 117 MG/DL (ref 70–99)
HCT VFR BLD CALC: 55.7 % (ref 42–52)
HDLC SERPL-MCNC: 41 MG/DL (ref 40–59)
HEMOGLOBIN: 18.4 G/DL (ref 14–18)
LDL CHOLESTEROL CALCULATED: 56 MG/DL (ref 0–129)
LYMPHOCYTES ABSOLUTE: 2.1 K/UL (ref 1–4.8)
LYMPHOCYTES RELATIVE PERCENT: 10 %
MCH RBC QN AUTO: 32 PG (ref 27–31.3)
MCHC RBC AUTO-ENTMCNC: 33 % (ref 33–37)
MCV RBC AUTO: 96.9 FL (ref 80–100)
MONOCYTES ABSOLUTE: 1.6 K/UL (ref 0.2–0.8)
MONOCYTES RELATIVE PERCENT: 14 %
NEUTROPHILS ABSOLUTE: 7.4 K/UL (ref 1.4–6.5)
NEUTROPHILS RELATIVE PERCENT: 64 %
PDW BLD-RTO: 13.7 % (ref 11.5–14.5)
PLATELET # BLD: 199 K/UL (ref 130–400)
PLATELET SLIDE REVIEW: NORMAL
POTASSIUM SERPL-SCNC: 4.3 MEQ/L (ref 3.4–4.9)
PROSTATE SPECIFIC ANTIGEN: 1.98 NG/ML (ref 0–3.89)
RBC # BLD: 5.75 M/UL (ref 4.7–6.1)
RBC # BLD: NORMAL 10*6/UL
SODIUM BLD-SCNC: 138 MEQ/L (ref 135–144)
TOTAL PROTEIN: 6.3 G/DL (ref 6.3–8)
TRIGL SERPL-MCNC: 282 MG/DL (ref 0–150)
WBC # BLD: 11.6 K/UL (ref 4.8–10.8)

## 2021-05-08 PROCEDURE — 80053 COMPREHEN METABOLIC PANEL: CPT

## 2021-05-08 PROCEDURE — G0103 PSA SCREENING: HCPCS

## 2021-05-08 PROCEDURE — 36415 COLL VENOUS BLD VENIPUNCTURE: CPT

## 2021-05-08 PROCEDURE — 85025 COMPLETE CBC W/AUTO DIFF WBC: CPT

## 2021-05-08 PROCEDURE — 80061 LIPID PANEL: CPT

## 2021-05-11 DIAGNOSIS — N17.9 AKI (ACUTE KIDNEY INJURY) (HCC): Primary | ICD-10-CM

## 2021-05-12 ENCOUNTER — TELEPHONE (OUTPATIENT)
Dept: FAMILY MEDICINE CLINIC | Age: 56
End: 2021-05-12

## 2021-05-12 NOTE — TELEPHONE ENCOUNTER
FYI:  Patient called said he was admitted to the hospital on Monday morning for A fib he is getting a Cathter to see if he has an artery blockage. Duane said they heard on the EKG a funny rhythm in the bottom part of his heart so they are going to check around for blockages.   He has a f/u with Dr Charan Beard his Cardiology when he gets out of the hospital.cp

## 2021-05-25 ENCOUNTER — TELEPHONE (OUTPATIENT)
Dept: FAMILY MEDICINE CLINIC | Age: 56
End: 2021-05-25

## 2021-05-25 NOTE — TELEPHONE ENCOUNTER
FYI: Patient called said that he seen Dr Powers Bachwesleyor his Cardiology on May 18 and he sees Dr Juanito Madrigal on June 4 and will get a Holter monitor that he has to wear for 48 hours he has been back to work since the 19th of May. cp

## 2021-05-28 NOTE — TELEPHONE ENCOUNTER
Pharmacy requesting medication refill.  Please approve or deny this request.    Rx requested:  Requested Prescriptions     Pending Prescriptions Disp Refills    atorvastatin (LIPITOR) 20 MG tablet [Pharmacy Med Name: ATORVASTATIN CALCIUM 20 MG TABLET] 30 tablet 5     Sig: Take 1 tablet by mouth daily         Last Office Visit:   5/28/2019      Next Visit Date:  Future Appointments   Date Time Provider Maegan Frederick   9/25/2019 12:00 PM Amber Del Cid MD [address]   12/2/2019 11:45 AM Naina Beach MD [facility]
Traveling alone

## 2021-12-31 ENCOUNTER — TELEPHONE (OUTPATIENT)
Dept: FAMILY MEDICINE CLINIC | Age: 56
End: 2021-12-31

## 2022-01-05 ENCOUNTER — TELEPHONE (OUTPATIENT)
Dept: FAMILY MEDICINE CLINIC | Age: 57
End: 2022-01-05

## 2022-01-05 NOTE — TELEPHONE ENCOUNTER
Left a voicemail for patient to return call to schedule hosp follow up, and also see if he can have records sent to us from his hospital stay.

## 2022-01-05 NOTE — TELEPHONE ENCOUNTER
----- Message from Justice Kirby sent at 1/4/2022  3:46 PM EST -----  Subject: Appointment Request    Reason for Call: Routine Existing Condition Follow Up    QUESTIONS  Type of Appointment? Established Patient  Reason for appointment request? Available appointments did not meet   patient need  Additional Information for Provider? Patient was attempting to schedule an   OV based on Thaddeus Jeff' message but. .. UPDATE? Patient began feeling unwell on   sun 12/12 and suspected COVID so went to [facility] in [facility]   early morning on Wed 12/15. Was admitted due to Afib and D/C'd on Fri 12/17. Patient no longer needs FMLA ppwk. Patient is f/u with two   [place] [facility] on 1/19 and Estefani Blanco on 2/1). Do you need   the patient to come in for a Hospital F/U visit? OK to leave DETAILED   message. ---------------------------------------------------------------------------  --------------  Deonteita Dust INFO  What is the best way for the office to contact you? OK to leave message on   voicemail  Preferred Call Back Phone Number? [phone]  ---------------------------------------------------------------------------  --------------  SCRIPT ANSWERS  Relationship to Patient? Self  Is this follow up request related to routine Diabetes Management? No  Have you been diagnosed with, awaiting test results for, or told that you   are suspected of having COVID-19 (Coronavirus)? (If patient has tested   negative or was tested as a requirement for work, school, or travel and   not based on symptoms, answer no)? No  Within the past two weeks have you developed any of the following symptoms   (answer no if symptoms have been present longer than 2 weeks or began   more than 2 weeks ago)?  Fever or Chills, Cough, Shortness of breath or   difficulty breathing, Loss of taste or smell, Sore throat, Nasal   congestion, Sneezing or runny nose, Fatigue or generalized body aches   (answer no if pain is specific to a body part e.g.

## 2023-03-15 ENCOUNTER — PATIENT OUTREACH (OUTPATIENT)
Dept: CARE COORDINATION | Facility: CLINIC | Age: 58
End: 2023-03-15
Payer: COMMERCIAL

## 2023-08-17 LAB
LV EF: 56 %
LVEF MODALITY: NORMAL

## 2023-10-25 ENCOUNTER — HOSPITAL ENCOUNTER (EMERGENCY)
Facility: HOSPITAL | Age: 58
Discharge: HOME | End: 2023-10-25
Payer: COMMERCIAL

## 2023-10-25 ENCOUNTER — APPOINTMENT (OUTPATIENT)
Dept: RADIOLOGY | Facility: HOSPITAL | Age: 58
End: 2023-10-25
Payer: COMMERCIAL

## 2023-10-25 VITALS
DIASTOLIC BLOOD PRESSURE: 86 MMHG | WEIGHT: 180 LBS | HEIGHT: 69 IN | BODY MASS INDEX: 26.66 KG/M2 | RESPIRATION RATE: 18 BRPM | OXYGEN SATURATION: 96 % | TEMPERATURE: 98.6 F | HEART RATE: 84 BPM | SYSTOLIC BLOOD PRESSURE: 151 MMHG

## 2023-10-25 DIAGNOSIS — J40 BRONCHITIS: Primary | ICD-10-CM

## 2023-10-25 LAB
ALBUMIN SERPL BCP-MCNC: 3.8 G/DL (ref 3.4–5)
ALP SERPL-CCNC: 92 U/L (ref 33–136)
ALT SERPL W P-5'-P-CCNC: 11 U/L (ref 10–52)
ANION GAP SERPL CALC-SCNC: 13 MMOL/L (ref 10–20)
APTT PPP: 40 SECONDS (ref 27–38)
AST SERPL W P-5'-P-CCNC: 18 U/L (ref 9–39)
BASOPHILS # BLD AUTO: 0.02 X10*3/UL (ref 0–0.1)
BASOPHILS NFR BLD AUTO: 0.2 %
BILIRUB SERPL-MCNC: 1.3 MG/DL (ref 0–1.2)
BNP SERPL-MCNC: 579 PG/ML (ref 0–99)
BUN SERPL-MCNC: 6 MG/DL (ref 6–23)
CALCIUM SERPL-MCNC: 8.5 MG/DL (ref 8.6–10.3)
CARDIAC TROPONIN I PNL SERPL HS: 12 NG/L (ref 0–20)
CHLORIDE SERPL-SCNC: 99 MMOL/L (ref 98–107)
CO2 SERPL-SCNC: 26 MMOL/L (ref 21–32)
CREAT SERPL-MCNC: 0.94 MG/DL (ref 0.5–1.3)
EOSINOPHIL # BLD AUTO: 0.06 X10*3/UL (ref 0–0.7)
EOSINOPHIL NFR BLD AUTO: 0.7 %
ERYTHROCYTE [DISTWIDTH] IN BLOOD BY AUTOMATED COUNT: 13.2 % (ref 11.5–14.5)
FLUAV RNA RESP QL NAA+PROBE: NOT DETECTED
FLUBV RNA RESP QL NAA+PROBE: NOT DETECTED
GFR SERPL CREATININE-BSD FRML MDRD: >90 ML/MIN/1.73M*2
GLUCOSE SERPL-MCNC: 95 MG/DL (ref 74–99)
HCT VFR BLD AUTO: 48.7 % (ref 41–52)
HGB BLD-MCNC: 17.2 G/DL (ref 13.5–17.5)
IMM GRANULOCYTES # BLD AUTO: 0.06 X10*3/UL (ref 0–0.7)
IMM GRANULOCYTES NFR BLD AUTO: 0.7 % (ref 0–0.9)
INR PPP: 1.5 (ref 0.9–1.1)
LYMPHOCYTES # BLD AUTO: 0.62 X10*3/UL (ref 1.2–4.8)
LYMPHOCYTES NFR BLD AUTO: 7.1 %
MCH RBC QN AUTO: 32.9 PG (ref 26–34)
MCHC RBC AUTO-ENTMCNC: 35.3 G/DL (ref 32–36)
MCV RBC AUTO: 93 FL (ref 80–100)
MONOCYTES # BLD AUTO: 0.63 X10*3/UL (ref 0.1–1)
MONOCYTES NFR BLD AUTO: 7.2 %
NEUTROPHILS # BLD AUTO: 7.35 X10*3/UL (ref 1.2–7.7)
NEUTROPHILS NFR BLD AUTO: 84.1 %
NRBC BLD-RTO: 0 /100 WBCS (ref 0–0)
PLATELET # BLD AUTO: 148 X10*3/UL (ref 150–450)
PMV BLD AUTO: 11.4 FL (ref 7.5–11.5)
POTASSIUM SERPL-SCNC: 3.4 MMOL/L (ref 3.5–5.3)
PROT SERPL-MCNC: 6.9 G/DL (ref 6.4–8.2)
PROTHROMBIN TIME: 16.4 SECONDS (ref 9.8–12.8)
RBC # BLD AUTO: 5.23 X10*6/UL (ref 4.5–5.9)
S PYO DNA THROAT QL NAA+PROBE: NOT DETECTED
SARS-COV-2 RNA RESP QL NAA+PROBE: NOT DETECTED
SODIUM SERPL-SCNC: 135 MMOL/L (ref 136–145)
WBC # BLD AUTO: 8.7 X10*3/UL (ref 4.4–11.3)

## 2023-10-25 PROCEDURE — 85610 PROTHROMBIN TIME: CPT | Performed by: PHYSICIAN ASSISTANT

## 2023-10-25 PROCEDURE — 87636 SARSCOV2 & INF A&B AMP PRB: CPT | Performed by: PHYSICIAN ASSISTANT

## 2023-10-25 PROCEDURE — 84484 ASSAY OF TROPONIN QUANT: CPT | Performed by: PHYSICIAN ASSISTANT

## 2023-10-25 PROCEDURE — 36415 COLL VENOUS BLD VENIPUNCTURE: CPT | Performed by: PHYSICIAN ASSISTANT

## 2023-10-25 PROCEDURE — 87651 STREP A DNA AMP PROBE: CPT | Performed by: PHYSICIAN ASSISTANT

## 2023-10-25 PROCEDURE — 99284 EMERGENCY DEPT VISIT MOD MDM: CPT | Mod: 25

## 2023-10-25 PROCEDURE — 83880 ASSAY OF NATRIURETIC PEPTIDE: CPT | Performed by: PHYSICIAN ASSISTANT

## 2023-10-25 PROCEDURE — 85025 COMPLETE CBC W/AUTO DIFF WBC: CPT | Performed by: PHYSICIAN ASSISTANT

## 2023-10-25 PROCEDURE — 2500000002 HC RX 250 W HCPCS SELF ADMINISTERED DRUGS (ALT 637 FOR MEDICARE OP, ALT 636 FOR OP/ED): Performed by: PHYSICIAN ASSISTANT

## 2023-10-25 PROCEDURE — 94640 AIRWAY INHALATION TREATMENT: CPT

## 2023-10-25 PROCEDURE — 71046 X-RAY EXAM CHEST 2 VIEWS: CPT | Performed by: RADIOLOGY

## 2023-10-25 PROCEDURE — 85730 THROMBOPLASTIN TIME PARTIAL: CPT | Performed by: PHYSICIAN ASSISTANT

## 2023-10-25 PROCEDURE — 99285 EMERGENCY DEPT VISIT HI MDM: CPT

## 2023-10-25 PROCEDURE — 71046 X-RAY EXAM CHEST 2 VIEWS: CPT

## 2023-10-25 PROCEDURE — 80053 COMPREHEN METABOLIC PANEL: CPT | Performed by: PHYSICIAN ASSISTANT

## 2023-10-25 PROCEDURE — 96374 THER/PROPH/DIAG INJ IV PUSH: CPT

## 2023-10-25 PROCEDURE — 2500000004 HC RX 250 GENERAL PHARMACY W/ HCPCS (ALT 636 FOR OP/ED): Performed by: PHYSICIAN ASSISTANT

## 2023-10-25 RX ORDER — BENZONATATE 100 MG/1
100 CAPSULE ORAL 3 TIMES DAILY PRN
Qty: 15 CAPSULE | Refills: 0 | Status: SHIPPED | OUTPATIENT
Start: 2023-10-25 | End: 2023-10-30

## 2023-10-25 RX ORDER — ONDANSETRON HYDROCHLORIDE 2 MG/ML
4 INJECTION, SOLUTION INTRAVENOUS ONCE
Status: COMPLETED | OUTPATIENT
Start: 2023-10-25 | End: 2023-10-25

## 2023-10-25 RX ORDER — AMOXICILLIN AND CLAVULANATE POTASSIUM 875; 125 MG/1; MG/1
1 TABLET, FILM COATED ORAL 2 TIMES DAILY
Qty: 20 TABLET | Refills: 0 | Status: SHIPPED | OUTPATIENT
Start: 2023-10-25 | End: 2023-11-04

## 2023-10-25 RX ORDER — ACETAMINOPHEN 325 MG/1
650 TABLET ORAL ONCE
Status: COMPLETED | OUTPATIENT
Start: 2023-10-25 | End: 2023-10-25

## 2023-10-25 RX ORDER — PREDNISONE 20 MG/1
20 TABLET ORAL 3 TIMES DAILY
Qty: 15 TABLET | Refills: 0 | Status: SHIPPED | OUTPATIENT
Start: 2023-10-25 | End: 2023-10-30

## 2023-10-25 RX ORDER — ONDANSETRON 4 MG/1
4 TABLET, ORALLY DISINTEGRATING ORAL EVERY 8 HOURS PRN
Qty: 20 TABLET | Refills: 0 | Status: SHIPPED | OUTPATIENT
Start: 2023-10-25 | End: 2023-11-01

## 2023-10-25 RX ORDER — ALBUTEROL SULFATE 90 UG/1
2 AEROSOL, METERED RESPIRATORY (INHALATION) ONCE
Status: COMPLETED | OUTPATIENT
Start: 2023-10-25 | End: 2023-10-25

## 2023-10-25 RX ADMIN — ACETAMINOPHEN 650 MG: 325 TABLET ORAL at 07:21

## 2023-10-25 RX ADMIN — ALBUTEROL SULFATE 2 PUFF: 90 AEROSOL, METERED RESPIRATORY (INHALATION) at 07:21

## 2023-10-25 RX ADMIN — SODIUM CHLORIDE 500 ML: 9 INJECTION, SOLUTION INTRAVENOUS at 07:21

## 2023-10-25 RX ADMIN — ONDANSETRON 4 MG: 2 INJECTION INTRAMUSCULAR; INTRAVENOUS at 07:21

## 2023-10-25 ASSESSMENT — COLUMBIA-SUICIDE SEVERITY RATING SCALE - C-SSRS
2. HAVE YOU ACTUALLY HAD ANY THOUGHTS OF KILLING YOURSELF?: NO
1. IN THE PAST MONTH, HAVE YOU WISHED YOU WERE DEAD OR WISHED YOU COULD GO TO SLEEP AND NOT WAKE UP?: NO
6. HAVE YOU EVER DONE ANYTHING, STARTED TO DO ANYTHING, OR PREPARED TO DO ANYTHING TO END YOUR LIFE?: NO

## 2023-10-25 ASSESSMENT — PAIN - FUNCTIONAL ASSESSMENT: PAIN_FUNCTIONAL_ASSESSMENT: 0-10

## 2023-10-25 NOTE — ED PROVIDER NOTES
HPI   Chief Complaint   Patient presents with    Flu Symptoms     Pt has been sick since Monday. Hx of afib       This is a 58-year-old male with a history of A-fib anticoagulated on Eliquis, hypertension, hyperlipidemia, CAD with multiple stents who presents to the emergency department for complaints of flulike symptoms.  Patient states he began feeling ill 3 days ago and has just been getting worse.  He has a cough and states he has been coughing so hard that now his chest hurts.  He has had body aches and chills, feels slightly short of breath and generally fatigued.  He smoked in the past but is not an active smoker and states he has never been diagnosed with COPD.  He took TheraFlu yesterday without relief.  States he had difficulty taking his pills this morning because he feels nauseated but he denies any vomiting, abdominal pain or diarrhea.  Of note, patient states he is always in A-fib.                          No data recorded                Patient History   Past Medical History:   Diagnosis Date    Atrial fibrillation (CMS/HCC)     Hypertension     Overweight 01/19/2022    Overweight    Personal history of other endocrine, nutritional and metabolic disease     History of obesity     Past Surgical History:   Procedure Laterality Date    OTHER SURGICAL HISTORY  10/16/2021    Cardiac catheterization with stent placement    OTHER SURGICAL HISTORY  10/16/2021    Inguinal hernia repair    OTHER SURGICAL HISTORY  08/09/2022    Cardioversion     No family history on file.  Social History     Tobacco Use    Smoking status: Former     Types: Cigarettes    Smokeless tobacco: Never   Substance Use Topics    Alcohol use: Never    Drug use: Never       Physical Exam   ED Triage Vitals [10/25/23 0607]   Temp Heart Rate Resp BP   38.1 °C (100.6 °F) (!) 120 18 141/79      SpO2 Temp Source Heart Rate Source Patient Position   96 % Temporal -- Sitting      BP Location FiO2 (%)     Right arm --       Physical Exam  Vitals  and nursing note reviewed.   Constitutional:       General: He is not in acute distress.  HENT:      Head: Atraumatic.      Mouth/Throat:      Mouth: Mucous membranes are moist.      Pharynx: Oropharynx is clear.   Eyes:      Extraocular Movements: Extraocular movements intact.      Conjunctiva/sclera: Conjunctivae normal.      Pupils: Pupils are equal, round, and reactive to light.   Cardiovascular:      Rate and Rhythm: Tachycardia present. Rhythm irregular.      Pulses: Normal pulses.   Pulmonary:      Effort: Pulmonary effort is normal. No respiratory distress.      Comments: Diminished breath sounds in the lower lobes bilaterally.  Clear breath sounds in the upper lung fields.  Abdominal:      General: There is no distension.      Palpations: Abdomen is soft.      Tenderness: There is no abdominal tenderness. There is no guarding or rebound.   Musculoskeletal:         General: No deformity.      Cervical back: Neck supple.   Skin:     General: Skin is warm and dry.   Neurological:      Mental Status: He is alert and oriented to person, place, and time. Mental status is at baseline.      Cranial Nerves: No cranial nerve deficit.      Sensory: No sensory deficit.      Motor: No weakness.   Psychiatric:         Mood and Affect: Mood normal.         Behavior: Behavior normal.         ED Course & MDM   ED Course as of 10/25/23 0916   Wed Oct 25, 2023   0811 INR(!): 1.5  Slight elevation of INR may occur with Eliquis therapy  [MS]      ED Course User Index  [MS] Sanjuana Longoria PA-C         Diagnoses as of 10/25/23 0916   Bronchitis       Medical Decision Making  58-year-old male who presents to the emergency department for complaints of flulike symptoms x3 days with a cough, body aches and chills, fatigue and mild shortness of breath.  On my exam, patient does not appear to be in acute distress.  He has a temperature of 38.1, heart rate in triage of 120 but it is around 105 when I am in the room.  His SPO2 is 99  for me and he is not in respiratory distress.  On my exam, his lungs are diminished in the lower lobes and his heart rate reveals tachycardia with an irregular rhythm.  Abdomen is soft and nontender.  EKG interpretation by me: A-fib with RVR with a rate of 104 with ST segment depressions in the lateral leads which appear similar to prior EKG  Labs do not show leukocytosis, left shift, anemia, metabolic disturbance, renal insufficiency.  BNP is nonspecifically elevated at 500 but I see that he has had previous elevations that are similar.  His troponin is within normal limits.  He has some nonspecific elevations of his coagulation panel which may occur with Eliquis therapy.  Chest x-ray interpreted by me: No acute cardiopulmonary process.  Confirmed by radiology.  On my repeat evaluation, patient is feeling much better.  His vital signs have improved.  His heart rate is in the 80s and his SPO2 is 99.  I discussed all findings with him.  Because of his history of smoking, and his comorbidities and his age, I will cover him with antibiotics to treat a potential bacterial infection or prevent pneumonia.  I will prescribe him Augmentin, prednisone, Tessalon Perles and Zofran.  He was given an albuterol inhaler here and will take at home and I instructed him to do 2 puffs every 4 hours while awake.  I recommended close follow-up with his primary care doctor within 48 hours for repeat evaluation.  Discussed how to properly treat his fever at home, adequate hydration and rest.  Discussed results with patient and/or family/friend and recommended close follow up with primary care or specialist.  Reviewed return precautions at length.  I answered all questions.          Procedure  Procedures     Sanjuana Longoria PA-C  10/25/23 0961

## 2023-10-25 NOTE — Clinical Note
Duane Morgan was seen and treated in our emergency department on 10/25/2023.  He may return to work on 10/30/2023.       If you have any questions or concerns, please don't hesitate to call.      Sanjuana Longoria PA-C

## 2023-10-29 DIAGNOSIS — I48.11 LONGSTANDING PERSISTENT ATRIAL FIBRILLATION (MULTI): ICD-10-CM

## 2023-11-12 ENCOUNTER — HOSPITAL ENCOUNTER (OUTPATIENT)
Dept: CARDIOLOGY | Facility: HOSPITAL | Age: 58
Discharge: HOME | End: 2023-11-12
Payer: COMMERCIAL

## 2023-11-12 PROCEDURE — 93005 ELECTROCARDIOGRAM TRACING: CPT

## 2023-11-24 PROBLEM — I50.9 HEART FAILURE, UNSPECIFIED (MULTI): Status: ACTIVE | Noted: 2019-03-01

## 2023-11-24 PROBLEM — I63.81 CEREBROVASCULAR ACCIDENT (CVA) DUE TO OCCLUSION OF SMALL ARTERY (MULTI): Status: ACTIVE | Noted: 2019-03-01

## 2023-11-24 PROBLEM — I48.11 LONGSTANDING PERSISTENT ATRIAL FIBRILLATION (MULTI): Status: ACTIVE | Noted: 2023-11-24

## 2023-11-24 PROBLEM — Z95.5 STATUS POST INSERTION OF DRUG-ELUTING STENT INTO LEFT ANTERIOR DESCENDING ARTERY FOR CORONARY ARTERY DISEASE: Status: ACTIVE | Noted: 2023-11-24

## 2023-11-24 PROBLEM — E78.2 HYPERLIPIDEMIA, MIXED: Status: ACTIVE | Noted: 2023-11-24

## 2023-11-24 PROBLEM — I25.10 CAD (CORONARY ARTERY DISEASE): Status: ACTIVE | Noted: 2023-11-24

## 2023-11-24 RX ORDER — DILTIAZEM HYDROCHLORIDE 240 MG/1
240 CAPSULE, COATED, EXTENDED RELEASE ORAL DAILY
COMMUNITY
Start: 2023-10-28 | End: 2024-03-07

## 2023-11-24 RX ORDER — VALSARTAN 320 MG/1
320 TABLET ORAL DAILY
COMMUNITY

## 2023-11-24 RX ORDER — ALBUTEROL SULFATE 90 UG/1
2 INHALANT RESPIRATORY (INHALATION) EVERY 6 HOURS PRN
COMMUNITY

## 2023-11-24 RX ORDER — APIXABAN 5 MG/1
5 TABLET, FILM COATED ORAL 2 TIMES DAILY
Qty: 180 TABLET | Refills: 3 | Status: SHIPPED | OUTPATIENT
Start: 2023-11-24

## 2023-11-24 RX ORDER — HYDRALAZINE HYDROCHLORIDE 100 MG/1
100 TABLET, FILM COATED ORAL 3 TIMES DAILY
COMMUNITY

## 2023-11-24 RX ORDER — CLOPIDOGREL BISULFATE 75 MG/1
1 TABLET ORAL DAILY
COMMUNITY
Start: 2022-06-29

## 2023-11-24 RX ORDER — CARVEDILOL 25 MG/1
25 TABLET ORAL 2 TIMES DAILY
COMMUNITY
Start: 2023-10-28 | End: 2024-04-05 | Stop reason: SDUPTHER

## 2023-11-24 RX ORDER — ATORVASTATIN CALCIUM 40 MG/1
40 TABLET, FILM COATED ORAL NIGHTLY
COMMUNITY
End: 2024-02-28

## 2023-11-24 RX ORDER — AMLODIPINE BESYLATE 10 MG/1
10 TABLET ORAL NIGHTLY
COMMUNITY
End: 2024-02-05

## 2023-11-24 RX ORDER — LANOLIN ALCOHOL/MO/W.PET/CERES
1 CREAM (GRAM) TOPICAL 2 TIMES DAILY
COMMUNITY

## 2023-11-24 RX ORDER — APIXABAN 5 MG/1
5 TABLET, FILM COATED ORAL EVERY 12 HOURS
COMMUNITY
End: 2024-02-15 | Stop reason: WASHOUT

## 2023-11-24 RX ORDER — POTASSIUM CHLORIDE 20 MEQ/1
20 TABLET, EXTENDED RELEASE ORAL 2 TIMES DAILY
COMMUNITY
Start: 2023-09-05

## 2023-11-28 DIAGNOSIS — I48.0 PAROXYSMAL ATRIAL FIBRILLATION (MULTI): ICD-10-CM

## 2023-12-06 RX ORDER — SOTALOL HYDROCHLORIDE 80 MG/1
120 TABLET ORAL 2 TIMES DAILY
Qty: 270 TABLET | Refills: 3 | Status: SHIPPED | OUTPATIENT
Start: 2023-12-06 | End: 2024-02-15 | Stop reason: WASHOUT

## 2024-01-19 PROBLEM — Z79.01 ANTICOAGULANT LONG-TERM USE: Status: ACTIVE | Noted: 2024-01-19

## 2024-01-19 PROBLEM — E66.3 OVERWEIGHT WITH BODY MASS INDEX (BMI) OF 26 TO 26.9 IN ADULT: Status: ACTIVE | Noted: 2024-01-19

## 2024-01-19 PROBLEM — I48.91 ATRIAL FIBRILLATION WITH RVR (MULTI): Status: ACTIVE | Noted: 2024-01-19

## 2024-01-25 ENCOUNTER — DOCUMENTATION (OUTPATIENT)
Dept: CARDIOLOGY | Facility: CLINIC | Age: 59
End: 2024-01-25
Payer: COMMERCIAL

## 2024-01-25 NOTE — PROGRESS NOTES
Rec'd fax request for updated [facility] paper work. Forms completed and faxed back to [phone]. Form sent to be scanned

## 2024-02-03 DIAGNOSIS — E78.2 HYPERLIPIDEMIA, MIXED: ICD-10-CM

## 2024-02-03 DIAGNOSIS — I10 ESSENTIAL (PRIMARY) HYPERTENSION: ICD-10-CM

## 2024-02-05 RX ORDER — AMLODIPINE BESYLATE 10 MG/1
10 TABLET ORAL NIGHTLY
Qty: 100 TABLET | Refills: 3 | Status: SHIPPED | OUTPATIENT
Start: 2024-02-05

## 2024-02-15 ENCOUNTER — OFFICE VISIT (OUTPATIENT)
Dept: CARDIOLOGY | Facility: CLINIC | Age: 59
End: 2024-02-15
Payer: COMMERCIAL

## 2024-02-15 VITALS
DIASTOLIC BLOOD PRESSURE: 72 MMHG | SYSTOLIC BLOOD PRESSURE: 118 MMHG | WEIGHT: 185 LBS | BODY MASS INDEX: 27.4 KG/M2 | HEART RATE: 53 BPM | HEIGHT: 69 IN

## 2024-02-15 DIAGNOSIS — Z79.01 ANTICOAGULANT LONG-TERM USE: ICD-10-CM

## 2024-02-15 DIAGNOSIS — I10 PRIMARY HYPERTENSION: ICD-10-CM

## 2024-02-15 DIAGNOSIS — I25.10 CORONARY ARTERY DISEASE INVOLVING NATIVE CORONARY ARTERY OF NATIVE HEART WITHOUT ANGINA PECTORIS: ICD-10-CM

## 2024-02-15 DIAGNOSIS — I48.11 LONGSTANDING PERSISTENT ATRIAL FIBRILLATION (MULTI): Primary | ICD-10-CM

## 2024-02-15 DIAGNOSIS — I50.9 HEART FAILURE, UNSPECIFIED HF CHRONICITY, UNSPECIFIED HEART FAILURE TYPE (MULTI): ICD-10-CM

## 2024-02-15 DIAGNOSIS — E78.2 HYPERLIPIDEMIA, MIXED: ICD-10-CM

## 2024-02-15 DIAGNOSIS — J43.9 PULMONARY EMPHYSEMA, UNSPECIFIED EMPHYSEMA TYPE (MULTI): ICD-10-CM

## 2024-02-15 DIAGNOSIS — I48.21 PERMANENT ATRIAL FIBRILLATION (MULTI): ICD-10-CM

## 2024-02-15 DIAGNOSIS — I63.81 CEREBROVASCULAR ACCIDENT (CVA) DUE TO OCCLUSION OF SMALL ARTERY (MULTI): ICD-10-CM

## 2024-02-15 DIAGNOSIS — Z95.5 STATUS POST INSERTION OF DRUG-ELUTING STENT INTO LEFT ANTERIOR DESCENDING ARTERY FOR CORONARY ARTERY DISEASE: ICD-10-CM

## 2024-02-15 PROBLEM — E66.3 OVERWEIGHT WITH BODY MASS INDEX (BMI) OF 26 TO 26.9 IN ADULT: Status: RESOLVED | Noted: 2024-01-19 | Resolved: 2024-02-15

## 2024-02-15 PROCEDURE — 99213 OFFICE O/P EST LOW 20 MIN: CPT | Performed by: NURSE PRACTITIONER

## 2024-02-15 PROCEDURE — 1036F TOBACCO NON-USER: CPT | Performed by: NURSE PRACTITIONER

## 2024-02-15 PROCEDURE — 3078F DIAST BP <80 MM HG: CPT | Performed by: NURSE PRACTITIONER

## 2024-02-15 PROCEDURE — 3074F SYST BP LT 130 MM HG: CPT | Performed by: NURSE PRACTITIONER

## 2024-02-15 NOTE — PROGRESS NOTES
"CARDIOLOGY OFFICE VISIT      CHIEF COMPLAINT  Chief Complaint   Patient presents with    Atrial Fibrillation     Chief complaint: \"I am doing fine.\"  HISTORY OF PRESENT ILLNESS  HPI  History: The patient is a 58-year-old  male who is followed for permanent atrial fibrillation on rate control strategy with carvedilol, diltiazem, magnesium oxide, and anticoagulated with Eliquis.  He denies complaints of palpitations, chest pain, dizziness, lightheadedness, shortness of breath, abdominal distention, or lower extremity edema.  Patient continues to work full-time.  He states he remains physically active.  Past Medical History  Past Medical History:   Diagnosis Date    Atrial fibrillation (CMS/[facility])     Hypertension     Overweight 01/19/2022    Overweight    Personal history of other endocrine, nutritional and metabolic disease     History of obesity       Social History  Social History     Tobacco Use    Smoking status: Former     Types: Cigarettes    Smokeless tobacco: Never   Substance Use Topics    Alcohol use: Yes    Drug use: Never       Family History     Family History   Problem Relation Name Age of Onset    Cancer Father      Hyperlipidemia Father      Other (ICD in place) Father      Other (arteriosclerotic cardiovascular disease) Father          Allergies:  No Known Allergies     Outpatient Medications:  Current Outpatient Medications   Medication Instructions    albuterol sulfate (Proair Digihaler) 90 mcg/actuation aero powdr breath act w/sensor inhaler 2 puffs, inhalation, Every 6 hours PRN    amLODIPine (NORVASC) 10 mg, oral, Nightly    atorvastatin (LIPITOR) 40 mg, oral, Nightly    carvedilol (COREG) 25 mg, oral, 2 times daily    clopidogrel (Plavix) 75 mg tablet 1 tablet, oral, Daily    dilTIAZem CD (CARDIZEM CD) 240 mg, oral, Daily    Eliquis 5 mg, oral, 2 times daily    hydrALAZINE (APRESOLINE) 100 mg, oral, 3 times daily    magnesium oxide (Mag-Ox) 400 mg (241.3 mg magnesium) tablet 1 tablet, " oral, 2 times daily    potassium chloride CR 20 mEq ER tablet 20 mEq, oral, 2 times daily    sotalol (BETAPACE) 120 mg, oral, 2 times daily    valsartan (DIOVAN) 320 mg, oral, Daily          REVIEW OF SYSTEMS  Review of Systems   All other systems reviewed and are negative.        VITALS  Vitals:    02/15/24 0955   BP: 118/72   Pulse: 53       PHYSICAL EXAM  Vitals and nursing note reviewed.   Constitutional:       Appearance: Normal appearance.   HENT:      Head: Normocephalic.   Neck:      Vascular: No JVD.   Cardiovascular:      Rate and Rhythm: Bradycardic rate and irregular rhythm.      Pulses: Normal pulses.      Heart sounds: Normal heart sounds.   Pulmonary:      Effort: Pulmonary effort is normal.      Breath sounds: Normal breath sounds.   Abdominal:      General: Bowel sounds are normal.      Palpations: Abdomen is soft.   Musculoskeletal:         General: Normal range of motion.      Cervical back: Normal range of motion.   Skin:     General: Skin is warm and dry.   Neurological:      General: No focal deficit present.      Mental Status: She is alert and oriented to person, place, and time.      Motor: Motor function is intact.   Psychiatric:         Attention and Perception: Attention and perception normal.         Mood and Affect: Mood and affect normal.         Speech: Speech normal.         Behavior: Behavior normal. Behavior is cooperative.         Thought Content: Thought content normal.         Cognition and Memory: Cognition and memory normal.     Labs and testing: Twelve-lead EKG reveals atrial fibrillation with slow ventricular response with a ventricular rate of 53 bpm.  QRS durations 86 ms,  ms, QTc 429 ms.  No acute ischemic changes are noted.  Holter monitor dated August 24, 2023 revealed the underlying rhythm to be sinus with episodes of atrial fibrillation with heart rates as low as 45 bpm, average heart rate 80 bpm, maximum heart rate 131 bpm and occasional PVCs, couplets,  triplets, and 5 beat runs of nonsustained VT at a rate of 136 bpm.  Longest sinus pause was 2.5 seconds occurring at 4:41 PM.  Myoview stress test dated August 17, 2023 revealed an ejection fraction of 56% with no acute ischemic changes or infarct patterns noted.      ASSESSMENT AND PLAN       Clinical impressions:  1.  Permanent atrial fibrillation with slow ventricular response on diltiazem, carvedilol, magnesium oxide and anticoagulated with Eliquis. Failure of sotalol to control the arrhythmia.  2. Coronary artery disease status post angioplasty with drug-eluting stent to the proximal LAD on May 12, 2021 and 40% distal RCA stenosis managed medically.  Myoview stress test dated August 17, 2023 revealing no acute ischemic changes or infarct patterns with an ejection fraction of 56%.  3. Normal left ventricular function per 2D echocardiogram dated April 9, 2021.  4. Mild dilation of the ascending aorta per 2D echocardiogram dated April 9, 2021.  5. Dyslipidemia on statin.  6. Hypertension, with a blood pressure today of 118/72.  7. Overweight with a BMI of 27.32.    Recommendations:  1.  Continue current medications as prescribed.  2.  Continue lifestyle modifications as discussed.  3.  Follow-up in office with Dr. Garcia in 6 months or sooner if needed.    Evaluation and note by Yamila Garcia CNP  **Please excuse any errors in grammar or translation related to this dictation.  Voice recognition software was utilized to prepare this document.**

## 2024-02-28 RX ORDER — ATORVASTATIN CALCIUM 40 MG/1
40 TABLET, FILM COATED ORAL NIGHTLY
Qty: 90 TABLET | Refills: 3 | Status: SHIPPED | OUTPATIENT
Start: 2024-02-28 | End: 2024-02-29

## 2024-02-29 DIAGNOSIS — E78.2 HYPERLIPIDEMIA, MIXED: ICD-10-CM

## 2024-02-29 RX ORDER — ATORVASTATIN CALCIUM 40 MG/1
40 TABLET, FILM COATED ORAL NIGHTLY
Qty: 90 TABLET | Refills: 3 | Status: SHIPPED | OUTPATIENT
Start: 2024-02-29

## 2024-03-05 DIAGNOSIS — I48.0 PAROXYSMAL ATRIAL FIBRILLATION (MULTI): ICD-10-CM

## 2024-03-07 RX ORDER — DILTIAZEM HYDROCHLORIDE 240 MG/1
240 CAPSULE, COATED, EXTENDED RELEASE ORAL DAILY
Qty: 90 CAPSULE | Refills: 3 | Status: SHIPPED | OUTPATIENT
Start: 2024-03-07

## 2024-04-05 DIAGNOSIS — I10 PRIMARY HYPERTENSION: ICD-10-CM

## 2024-04-05 DIAGNOSIS — I48.11 LONGSTANDING PERSISTENT ATRIAL FIBRILLATION (MULTI): ICD-10-CM

## 2024-04-06 RX ORDER — CARVEDILOL 25 MG/1
25 TABLET ORAL 2 TIMES DAILY
Qty: 180 TABLET | Refills: 3 | Status: SHIPPED | OUTPATIENT
Start: 2024-04-06

## 2024-05-14 ENCOUNTER — APPOINTMENT (OUTPATIENT)
Dept: RADIOLOGY | Facility: HOSPITAL | Age: 59
End: 2024-05-14
Payer: COMMERCIAL

## 2024-05-14 ENCOUNTER — APPOINTMENT (OUTPATIENT)
Dept: CARDIOLOGY | Facility: HOSPITAL | Age: 59
End: 2024-05-14
Payer: COMMERCIAL

## 2024-05-14 ENCOUNTER — HOSPITAL ENCOUNTER (EMERGENCY)
Facility: HOSPITAL | Age: 59
Discharge: HOME | End: 2024-05-14
Attending: EMERGENCY MEDICINE
Payer: COMMERCIAL

## 2024-05-14 VITALS
RESPIRATION RATE: 20 BRPM | OXYGEN SATURATION: 97 % | HEIGHT: 69 IN | SYSTOLIC BLOOD PRESSURE: 147 MMHG | WEIGHT: 180 LBS | TEMPERATURE: 97.2 F | BODY MASS INDEX: 26.66 KG/M2 | HEART RATE: 66 BPM | DIASTOLIC BLOOD PRESSURE: 78 MMHG

## 2024-05-14 DIAGNOSIS — I49.3 PVC (PREMATURE VENTRICULAR CONTRACTION): ICD-10-CM

## 2024-05-14 DIAGNOSIS — I48.19 PERSISTENT ATRIAL FIBRILLATION (MULTI): Primary | ICD-10-CM

## 2024-05-14 LAB
ANION GAP SERPL CALC-SCNC: 15 MMOL/L (ref 10–20)
BASOPHILS # BLD AUTO: 0.04 X10*3/UL (ref 0–0.1)
BASOPHILS NFR BLD AUTO: 0.6 %
BNP SERPL-MCNC: 281 PG/ML (ref 0–99)
BUN SERPL-MCNC: 6 MG/DL (ref 6–23)
CALCIUM SERPL-MCNC: 8.9 MG/DL (ref 8.6–10.3)
CARDIAC TROPONIN I PNL SERPL HS: 9 NG/L (ref 0–20)
CHLORIDE SERPL-SCNC: 99 MMOL/L (ref 98–107)
CO2 SERPL-SCNC: 23 MMOL/L (ref 21–32)
CREAT SERPL-MCNC: 0.86 MG/DL (ref 0.5–1.3)
EGFRCR SERPLBLD CKD-EPI 2021: >90 ML/MIN/1.73M*2
EOSINOPHIL # BLD AUTO: 0.2 X10*3/UL (ref 0–0.7)
EOSINOPHIL NFR BLD AUTO: 3 %
ERYTHROCYTE [DISTWIDTH] IN BLOOD BY AUTOMATED COUNT: 13.6 % (ref 11.5–14.5)
GLUCOSE SERPL-MCNC: 113 MG/DL (ref 74–99)
HCT VFR BLD AUTO: 50.8 % (ref 41–52)
HGB BLD-MCNC: 17.8 G/DL (ref 13.5–17.5)
HOLD SPECIMEN: NORMAL
HOLD SPECIMEN: NORMAL
IMM GRANULOCYTES # BLD AUTO: 0.3 X10*3/UL (ref 0–0.7)
IMM GRANULOCYTES NFR BLD AUTO: 4.5 % (ref 0–0.9)
LYMPHOCYTES # BLD AUTO: 1.35 X10*3/UL (ref 1.2–4.8)
LYMPHOCYTES NFR BLD AUTO: 20.4 %
MCH RBC QN AUTO: 33.2 PG (ref 26–34)
MCHC RBC AUTO-ENTMCNC: 35 G/DL (ref 32–36)
MCV RBC AUTO: 95 FL (ref 80–100)
MONOCYTES # BLD AUTO: 0.71 X10*3/UL (ref 0.1–1)
MONOCYTES NFR BLD AUTO: 10.7 %
NEUTROPHILS # BLD AUTO: 4.02 X10*3/UL (ref 1.2–7.7)
NEUTROPHILS NFR BLD AUTO: 60.8 %
NRBC BLD-RTO: 0 /100 WBCS (ref 0–0)
PLATELET # BLD AUTO: 146 X10*3/UL (ref 150–450)
POTASSIUM SERPL-SCNC: 3.6 MMOL/L (ref 3.5–5.3)
RBC # BLD AUTO: 5.36 X10*6/UL (ref 4.5–5.9)
SODIUM SERPL-SCNC: 133 MMOL/L (ref 136–145)
WBC # BLD AUTO: 6.6 X10*3/UL (ref 4.4–11.3)

## 2024-05-14 PROCEDURE — 2500000004 HC RX 250 GENERAL PHARMACY W/ HCPCS (ALT 636 FOR OP/ED): Performed by: EMERGENCY MEDICINE

## 2024-05-14 PROCEDURE — 84484 ASSAY OF TROPONIN QUANT: CPT | Performed by: EMERGENCY MEDICINE

## 2024-05-14 PROCEDURE — 71045 X-RAY EXAM CHEST 1 VIEW: CPT

## 2024-05-14 PROCEDURE — 80048 BASIC METABOLIC PNL TOTAL CA: CPT | Performed by: EMERGENCY MEDICINE

## 2024-05-14 PROCEDURE — 93005 ELECTROCARDIOGRAM TRACING: CPT

## 2024-05-14 PROCEDURE — 96361 HYDRATE IV INFUSION ADD-ON: CPT

## 2024-05-14 PROCEDURE — 71045 X-RAY EXAM CHEST 1 VIEW: CPT | Performed by: RADIOLOGY

## 2024-05-14 PROCEDURE — 85025 COMPLETE CBC W/AUTO DIFF WBC: CPT | Performed by: EMERGENCY MEDICINE

## 2024-05-14 PROCEDURE — 99284 EMERGENCY DEPT VISIT MOD MDM: CPT | Mod: 25

## 2024-05-14 PROCEDURE — 83880 ASSAY OF NATRIURETIC PEPTIDE: CPT | Performed by: EMERGENCY MEDICINE

## 2024-05-14 PROCEDURE — 96365 THER/PROPH/DIAG IV INF INIT: CPT

## 2024-05-14 PROCEDURE — 36415 COLL VENOUS BLD VENIPUNCTURE: CPT | Performed by: EMERGENCY MEDICINE

## 2024-05-14 RX ORDER — MAGNESIUM SULFATE HEPTAHYDRATE 40 MG/ML
2 INJECTION, SOLUTION INTRAVENOUS ONCE
Status: COMPLETED | OUTPATIENT
Start: 2024-05-14 | End: 2024-05-14

## 2024-05-14 RX ADMIN — MAGNESIUM SULFATE HEPTAHYDRATE 2 G: 40 INJECTION, SOLUTION INTRAVENOUS at 05:32

## 2024-05-14 RX ADMIN — SODIUM CHLORIDE 500 ML: 9 INJECTION, SOLUTION INTRAVENOUS at 05:26

## 2024-05-14 ASSESSMENT — PAIN DESCRIPTION - DESCRIPTORS: DESCRIPTORS: PRESSURE

## 2024-05-14 ASSESSMENT — PAIN DESCRIPTION - PAIN TYPE: TYPE: ACUTE PAIN

## 2024-05-14 ASSESSMENT — PAIN DESCRIPTION - FREQUENCY: FREQUENCY: INTERMITTENT

## 2024-05-14 ASSESSMENT — PAIN DESCRIPTION - ORIENTATION: ORIENTATION: MID

## 2024-05-14 ASSESSMENT — COLUMBIA-SUICIDE SEVERITY RATING SCALE - C-SSRS
6. HAVE YOU EVER DONE ANYTHING, STARTED TO DO ANYTHING, OR PREPARED TO DO ANYTHING TO END YOUR LIFE?: NO
2. HAVE YOU ACTUALLY HAD ANY THOUGHTS OF KILLING YOURSELF?: NO
1. IN THE PAST MONTH, HAVE YOU WISHED YOU WERE DEAD OR WISHED YOU COULD GO TO SLEEP AND NOT WAKE UP?: NO

## 2024-05-14 ASSESSMENT — PAIN SCALES - GENERAL: PAINLEVEL_OUTOF10: 5 - MODERATE PAIN

## 2024-05-14 ASSESSMENT — PAIN DESCRIPTION - PROGRESSION: CLINICAL_PROGRESSION: NOT CHANGED

## 2024-05-14 ASSESSMENT — PAIN DESCRIPTION - LOCATION: LOCATION: CHEST

## 2024-05-14 ASSESSMENT — PAIN - FUNCTIONAL ASSESSMENT: PAIN_FUNCTIONAL_ASSESSMENT: 0-10

## 2024-05-14 NOTE — ED PROVIDER NOTES
HPI   Chief Complaint   Patient presents with    Chest Pain     Hx of a fib. Intermittent chest pressure       Chief complaint chest pain  History of present illness 58-year-old male who is here with his atrial fibrillation rate.  He has a history of atrial fibrillation states ever since he had the Heimlich maneuver a few weeks ago he said this chest discomfort and is here for assessment he is here with atrial fibrillation okay with PVCs                          [place] Coma Scale Score: 15                     Patient History   Past Medical History:   Diagnosis Date    Atrial fibrillation (Multi)     Hypertension     Overweight 01/19/2022    Overweight    Personal history of other endocrine, nutritional and metabolic disease     History of obesity     Past Surgical History:   Procedure Laterality Date    OTHER SURGICAL HISTORY  10/16/2021    Cardiac catheterization with stent placement    OTHER SURGICAL HISTORY  10/16/2021    Inguinal hernia repair    OTHER SURGICAL HISTORY  08/09/2022    Cardioversion     Family History   Problem Relation Name Age of Onset    Cancer Father      Hyperlipidemia Father      Other (ICD in place) Father      Other (arteriosclerotic cardiovascular disease) Father       Social History     Tobacco Use    Smoking status: Former     Types: Cigarettes    Smokeless tobacco: Never   Substance Use Topics    Alcohol use: Yes    Drug use: Never       Physical Exam   ED Triage Vitals [05/14/24 0431]   Temperature Heart Rate Respirations BP   36.2 °C (97.2 °F) 81 20 142/81      Pulse Ox Temp Source Heart Rate Source Patient Position   97 % Temporal Monitor Sitting      BP Location FiO2 (%)     Right arm --       Physical Exam  Vitals and nursing note reviewed.   Constitutional:       Appearance: He is well-developed and normal weight.   HENT:      Head: Normocephalic.   Eyes:      Extraocular Movements: Extraocular movements intact.      Pupils: Pupils are equal, round, and reactive to light.    Cardiovascular:      Rate and Rhythm: Normal rate. Rhythm irregular.      Heart sounds: Normal heart sounds.   Pulmonary:      Effort: Pulmonary effort is normal.   Abdominal:      General: Bowel sounds are normal.      Palpations: Abdomen is soft.   Musculoskeletal:         General: Normal range of motion.      Cervical back: Normal range of motion and neck supple.   Skin:     General: Skin is warm.      Capillary Refill: Capillary refill takes less than 2 seconds.   Neurological:      Mental Status: He is alert.         ED Course & MDM   Diagnoses as of 05/14/24 2143   Persistent atrial fibrillation (Multi)   PVC (premature ventricular contraction)       Medical Decision Making  Differential diagnosis #1 anxiety  #2 V. Tach  #3 atrial fibrillation with rapid ventricular rate  #4 CHF  #5 pericardial effusion is  #6 acute coronary syndrome considering the above differential diagnosis following tests and treatments were considered in order with  IV cardiac monitor magnesium chest x-ray EKG CBC electrolytes troponin BNP magnesium 2 g IV    Amount and/or Complexity of Data Reviewed  Labs:  Decision-making details documented in ED Course.  Radiology: ordered and independent interpretation performed.     Details: Chest x-ray clear  ECG/medicine tests: ordered and independent interpretation performed.     Details: EKG reveals atrial fibrillation with a rate of 81 with PVCs  Discussion of management or test interpretation with external provider(s): Labs were within normal limits see PVCs subsided with magnesium patient was discharged to home recommend follow-up with cardiology      Labs Reviewed   CBC WITH AUTO DIFFERENTIAL - Abnormal       Result Value    WBC 6.6      nRBC 0.0      RBC 5.36      Hemoglobin 17.8 (*)     Hematocrit 50.8      MCV 95      MCH 33.2      MCHC 35.0      RDW 13.6      Platelets 146 (*)     Neutrophils % 60.8      Immature Granulocytes %, Automated 4.5 (*)     Lymphocytes % 20.4      Monocytes %  10.7      Eosinophils % 3.0      Basophils % 0.6      Neutrophils Absolute 4.02      Immature Granulocytes Absolute, Automated 0.30      Lymphocytes Absolute 1.35      Monocytes Absolute 0.71      Eosinophils Absolute 0.20      Basophils Absolute 0.04     BASIC METABOLIC PANEL - Abnormal    Glucose 113 (*)     Sodium 133 (*)     Potassium 3.6      Chloride 99      Bicarbonate 23      Anion Gap 15      Urea Nitrogen 6      Creatinine 0.86      eGFR >90      Calcium 8.9     B-TYPE NATRIURETIC PEPTIDE - Abnormal     (*)     Narrative:        <100 pg/mL - Heart failure unlikely  100-299 pg/mL - Intermediate probability of acute heart                  failure exacerbation. Correlate with clinical                  context and patient history.    >=300 pg/mL - Heart Failure likely. Correlate with clinical                  context and patient history.    BNP testing is performed using different testing methodology at [facility] than at other [facility]. Direct result comparisons should only be made within the same method.      TROPONIN I, HIGH SENSITIVITY - Normal    Troponin I, High Sensitivity 9      Narrative:     Less than 99th percentile of normal range cutoff-  Female and children under 18 years old <14 ng/L; Male <21 ng/L: Negative  Repeat testing should be performed if clinically indicated.     Female and children under 18 years old 14-50 ng/L; Male 21-50 ng/L:  Consistent with possible cardiac damage and possible increased clinical   risk. Serial measurements may help to assess extent of myocardial damage.     >50 ng/L: Consistent with cardiac damage, increased clinical risk and  myocardial infarction. Serial measurements may help assess extent of   myocardial damage.      NOTE: Children less than 1 year old may have higher baseline troponin   levels and results should be interpreted in conjunction with the overall   clinical context.     NOTE: Troponin I testing is performed using a  different   testing methodology at [facility] than at other   [facility]. Direct result comparisons should only   be made within the same method.        XR chest 1 view   Final Result   No radiographic evidence of acute cardiopulmonary pathology.        MACRO:   None.        Signed by: Evan Finkelstein 5/14/2024 6:03 AM   Dictation workstation:   [id number]             Procedure  ECG 12 lead    Performed by: Madonna Aldana MD  Authorized by: Madonna Aldana MD    ECG interpreted by ED Physician in the absence of a cardiologist: yes    Interpretation:     Interpretation: normal    Rate:     ECG rate:  81  Rhythm:     Rhythm: atrial fibrillation    Ectopy:     Ectopy: PVCs      PVCs:  Unifocal  QRS:     QRS axis:  Normal  ST segments:     ST segments:  Normal  T waves:     T waves: normal         Madonna Aldana MD  05/14/24 8269

## 2024-05-14 NOTE — Clinical Note
Duane Morgan was seen and treated in our emergency department on 5/14/2024.  He may return to work on 05/16/2024.       If you have any questions or concerns, please don't hesitate to call.      Madonna Aldana MD

## 2024-05-17 LAB
ATRIAL RATE: 75 BPM
Q ONSET: 224 MS
QRS COUNT: 13 BEATS
QRS DURATION: 84 MS
QT INTERVAL: 418 MS
QTC CALCULATION(BAZETT): 485 MS
QTC FREDERICIA: 461 MS
R AXIS: 66 DEGREES
T AXIS: 106 DEGREES
T OFFSET: 433 MS
VENTRICULAR RATE: 81 BPM

## 2024-07-29 DIAGNOSIS — I48.11 LONGSTANDING PERSISTENT ATRIAL FIBRILLATION (MULTI): ICD-10-CM

## 2024-07-29 DIAGNOSIS — I10 ESSENTIAL (PRIMARY) HYPERTENSION: ICD-10-CM

## 2024-07-30 RX ORDER — VALSARTAN 320 MG/1
320 TABLET ORAL DAILY
Qty: 90 TABLET | Refills: 3 | Status: SHIPPED | OUTPATIENT
Start: 2024-07-30

## 2024-07-30 NOTE — TELEPHONE ENCOUNTER
Received request for prescription refills for patient.   Patient follows with Dr. Garcia     Request is for Valsartan 320mg QD  Is patient currently on medication yes    Last OV 2/15/24  Next OV 8/8/24    Pended for signing and sent to provider

## 2024-08-08 ENCOUNTER — APPOINTMENT (OUTPATIENT)
Dept: CARDIOLOGY | Facility: CLINIC | Age: 59
End: 2024-08-08
Payer: COMMERCIAL

## 2024-08-14 ENCOUNTER — APPOINTMENT (OUTPATIENT)
Dept: CARDIOLOGY | Facility: CLINIC | Age: 59
End: 2024-08-14
Payer: COMMERCIAL

## 2024-10-08 ENCOUNTER — HOSPITAL ENCOUNTER (EMERGENCY)
Facility: HOSPITAL | Age: 59
Discharge: HOME | End: 2024-10-08
Attending: INTERNAL MEDICINE
Payer: COMMERCIAL

## 2024-10-08 ENCOUNTER — APPOINTMENT (OUTPATIENT)
Dept: CARDIOLOGY | Facility: HOSPITAL | Age: 59
End: 2024-10-08
Payer: COMMERCIAL

## 2024-10-08 ENCOUNTER — APPOINTMENT (OUTPATIENT)
Dept: RADIOLOGY | Facility: HOSPITAL | Age: 59
End: 2024-10-08
Payer: COMMERCIAL

## 2024-10-08 VITALS
WEIGHT: 180 LBS | OXYGEN SATURATION: 99 % | HEART RATE: 65 BPM | TEMPERATURE: 97.2 F | HEIGHT: 69 IN | RESPIRATION RATE: 18 BRPM | SYSTOLIC BLOOD PRESSURE: 136 MMHG | BODY MASS INDEX: 26.66 KG/M2 | DIASTOLIC BLOOD PRESSURE: 80 MMHG

## 2024-10-08 DIAGNOSIS — R07.9 ACUTE CHEST PAIN: Primary | ICD-10-CM

## 2024-10-08 DIAGNOSIS — I48.20 CHRONIC ATRIAL FIBRILLATION (MULTI): ICD-10-CM

## 2024-10-08 LAB
ALBUMIN SERPL BCP-MCNC: 4.1 G/DL (ref 3.4–5)
ALP SERPL-CCNC: 86 U/L (ref 33–136)
ALT SERPL W P-5'-P-CCNC: 15 U/L (ref 10–52)
ANION GAP SERPL CALC-SCNC: 13 MMOL/L (ref 10–20)
AST SERPL W P-5'-P-CCNC: 21 U/L (ref 9–39)
ATRIAL RATE: 234 BPM
ATRIAL RATE: 93 BPM
BASOPHILS # BLD AUTO: 0.04 X10*3/UL (ref 0–0.1)
BASOPHILS NFR BLD AUTO: 0.7 %
BILIRUB SERPL-MCNC: 1.5 MG/DL (ref 0–1.2)
BUN SERPL-MCNC: 7 MG/DL (ref 6–23)
CALCIUM SERPL-MCNC: 9 MG/DL (ref 8.6–10.3)
CARDIAC TROPONIN I PNL SERPL HS: 10 NG/L (ref 0–20)
CARDIAC TROPONIN I PNL SERPL HS: 9 NG/L (ref 0–20)
CHLORIDE SERPL-SCNC: 100 MMOL/L (ref 98–107)
CO2 SERPL-SCNC: 24 MMOL/L (ref 21–32)
CREAT SERPL-MCNC: 0.75 MG/DL (ref 0.5–1.3)
EGFRCR SERPLBLD CKD-EPI 2021: >90 ML/MIN/1.73M*2
EOSINOPHIL # BLD AUTO: 0.09 X10*3/UL (ref 0–0.7)
EOSINOPHIL NFR BLD AUTO: 1.5 %
ERYTHROCYTE [DISTWIDTH] IN BLOOD BY AUTOMATED COUNT: 13.1 % (ref 11.5–14.5)
GLUCOSE SERPL-MCNC: 123 MG/DL (ref 74–99)
HCT VFR BLD AUTO: 50.1 % (ref 41–52)
HGB BLD-MCNC: 17.6 G/DL (ref 13.5–17.5)
HOLD SPECIMEN: NORMAL
IMM GRANULOCYTES # BLD AUTO: 0.01 X10*3/UL (ref 0–0.7)
IMM GRANULOCYTES NFR BLD AUTO: 0.2 % (ref 0–0.9)
LYMPHOCYTES # BLD AUTO: 1.21 X10*3/UL (ref 1.2–4.8)
LYMPHOCYTES NFR BLD AUTO: 20.5 %
MAGNESIUM SERPL-MCNC: 1.55 MG/DL (ref 1.6–2.4)
MCH RBC QN AUTO: 33.2 PG (ref 26–34)
MCHC RBC AUTO-ENTMCNC: 35.1 G/DL (ref 32–36)
MCV RBC AUTO: 95 FL (ref 80–100)
MONOCYTES # BLD AUTO: 0.83 X10*3/UL (ref 0.1–1)
MONOCYTES NFR BLD AUTO: 14.1 %
NEUTROPHILS # BLD AUTO: 3.72 X10*3/UL (ref 1.2–7.7)
NEUTROPHILS NFR BLD AUTO: 63 %
NRBC BLD-RTO: 0 /100 WBCS (ref 0–0)
PLATELET # BLD AUTO: 150 X10*3/UL (ref 150–450)
POTASSIUM SERPL-SCNC: 3.5 MMOL/L (ref 3.5–5.3)
PROT SERPL-MCNC: 7 G/DL (ref 6.4–8.2)
Q ONSET: 224 MS
Q ONSET: 225 MS
QRS COUNT: 12 BEATS
QRS COUNT: 15 BEATS
QRS DURATION: 80 MS
QRS DURATION: 84 MS
QT INTERVAL: 412 MS
QT INTERVAL: 458 MS
QTC CALCULATION(BAZETT): 490 MS
QTC CALCULATION(BAZETT): 504 MS
QTC FREDERICIA: 471 MS
QTC FREDERICIA: 480 MS
R AXIS: 66 DEGREES
R AXIS: 79 DEGREES
RBC # BLD AUTO: 5.3 X10*6/UL (ref 4.5–5.9)
SODIUM SERPL-SCNC: 133 MMOL/L (ref 136–145)
T AXIS: 107 DEGREES
T AXIS: 109 DEGREES
T OFFSET: 431 MS
T OFFSET: 453 MS
VENTRICULAR RATE: 69 BPM
VENTRICULAR RATE: 90 BPM
WBC # BLD AUTO: 5.9 X10*3/UL (ref 4.4–11.3)

## 2024-10-08 PROCEDURE — 71045 X-RAY EXAM CHEST 1 VIEW: CPT

## 2024-10-08 PROCEDURE — 93005 ELECTROCARDIOGRAM TRACING: CPT

## 2024-10-08 PROCEDURE — 36415 COLL VENOUS BLD VENIPUNCTURE: CPT | Performed by: PHYSICIAN ASSISTANT

## 2024-10-08 PROCEDURE — 71045 X-RAY EXAM CHEST 1 VIEW: CPT | Performed by: RADIOLOGY

## 2024-10-08 PROCEDURE — 85025 COMPLETE CBC W/AUTO DIFF WBC: CPT | Performed by: PHYSICIAN ASSISTANT

## 2024-10-08 PROCEDURE — 2500000004 HC RX 250 GENERAL PHARMACY W/ HCPCS (ALT 636 FOR OP/ED): Performed by: PHYSICIAN ASSISTANT

## 2024-10-08 PROCEDURE — 80053 COMPREHEN METABOLIC PANEL: CPT | Performed by: PHYSICIAN ASSISTANT

## 2024-10-08 PROCEDURE — 84484 ASSAY OF TROPONIN QUANT: CPT | Performed by: PHYSICIAN ASSISTANT

## 2024-10-08 PROCEDURE — 99284 EMERGENCY DEPT VISIT MOD MDM: CPT

## 2024-10-08 PROCEDURE — 83735 ASSAY OF MAGNESIUM: CPT | Performed by: PHYSICIAN ASSISTANT

## 2024-10-08 RX ORDER — LANOLIN ALCOHOL/MO/W.PET/CERES
400 CREAM (GRAM) TOPICAL ONCE
Status: COMPLETED | OUTPATIENT
Start: 2024-10-08 | End: 2024-10-08

## 2024-10-08 ASSESSMENT — PAIN DESCRIPTION - DESCRIPTORS: DESCRIPTORS: DULL

## 2024-10-08 ASSESSMENT — PAIN SCALES - GENERAL: PAINLEVEL_OUTOF10: 5 - MODERATE PAIN

## 2024-10-08 ASSESSMENT — PAIN DESCRIPTION - PAIN TYPE: TYPE: ACUTE PAIN

## 2024-10-08 ASSESSMENT — PAIN DESCRIPTION - LOCATION: LOCATION: CHEST

## 2024-10-08 ASSESSMENT — COLUMBIA-SUICIDE SEVERITY RATING SCALE - C-SSRS
1. IN THE PAST MONTH, HAVE YOU WISHED YOU WERE DEAD OR WISHED YOU COULD GO TO SLEEP AND NOT WAKE UP?: NO
6. HAVE YOU EVER DONE ANYTHING, STARTED TO DO ANYTHING, OR PREPARED TO DO ANYTHING TO END YOUR LIFE?: NO
2. HAVE YOU ACTUALLY HAD ANY THOUGHTS OF KILLING YOURSELF?: NO

## 2024-10-08 ASSESSMENT — PAIN DESCRIPTION - FREQUENCY: FREQUENCY: INTERMITTENT

## 2024-10-08 ASSESSMENT — PAIN - FUNCTIONAL ASSESSMENT: PAIN_FUNCTIONAL_ASSESSMENT: 0-10

## 2024-10-08 ASSESSMENT — PAIN DESCRIPTION - ORIENTATION: ORIENTATION: MID

## 2024-10-08 NOTE — Clinical Note
Duane Morgan was seen and treated in our emergency department on 10/8/2024.  He may return to work on 10/09/2024.       If you have any questions or concerns, please don't hesitate to call.      Chris Sales PA-C

## 2024-10-08 NOTE — ED PROVIDER NOTES
HPI   Chief Complaint   Patient presents with    Chest Pain     Chest pain off and on since yesterday, woke me up in the middle of the night, feels like AFib       Patient is a 59-year-old male with a past medical history of paroxysmal atrial relation on anticoagulation, hypertension, hyperlipidemia, CAD status post prior PCI, asthma who presents emergency room with chief complaint of a sudden onset of palpitations with chest pressure that started while he was lying in bed this morning.  The patient states he took his morning medications and after waiting his symptoms slowly resolved.  He now reports a very mild discomfort midsternally.  He denies any diaphoresis, dizziness, back pain, cough or shortness of breath.  Patient has been admitted multiple times for atrial fibrillation he is also status post DC conversion.      History provided by:  Patient and medical records          Patient History   Past Medical History:   Diagnosis Date    Atrial fibrillation (Multi)     Hypertension     Overweight 01/19/2022    Overweight    Personal history of other endocrine, nutritional and metabolic disease     History of obesity     Past Surgical History:   Procedure Laterality Date    OTHER SURGICAL HISTORY  10/16/2021    Cardiac catheterization with stent placement    OTHER SURGICAL HISTORY  10/16/2021    Inguinal hernia repair    OTHER SURGICAL HISTORY  08/09/2022    Cardioversion     Family History   Problem Relation Name Age of Onset    Cancer Father      Hyperlipidemia Father      Other (ICD in place) Father      Other (arteriosclerotic cardiovascular disease) Father       Social History     Tobacco Use    Smoking status: Former     Types: Cigarettes    Smokeless tobacco: Never   Substance Use Topics    Alcohol use: Yes    Drug use: Never       Physical Exam   ED Triage Vitals [10/08/24 0428]   Temperature Heart Rate Respirations BP   36.2 °C (97.2 °F) 81 17 132/83      Pulse Ox Temp Source Heart Rate Source Patient  Position   97 % Temporal Monitor Sitting      BP Location FiO2 (%)     Right arm --       Physical Exam  Vitals and nursing note reviewed.   Constitutional:       General: He is awake. He is not in acute distress.     Appearance: Normal appearance. He is well-developed, well-groomed and normal weight. He is not ill-appearing, toxic-appearing or diaphoretic.   HENT:      Head: Normocephalic and atraumatic.      Right Ear: Tympanic membrane, ear canal and external ear normal.      Left Ear: Tympanic membrane, ear canal and external ear normal.      Nose: Nose normal.      Mouth/Throat:      Mouth: Mucous membranes are moist.      Pharynx: Oropharynx is clear.   Eyes:      Extraocular Movements: Extraocular movements intact.      Conjunctiva/sclera: Conjunctivae normal.      Pupils: Pupils are equal, round, and reactive to light.   Cardiovascular:      Rate and Rhythm: Normal rate. Rhythm irregular.      Pulses: Normal pulses.      Heart sounds: Normal heart sounds.   Pulmonary:      Effort: Pulmonary effort is normal.      Breath sounds: Normal breath sounds. No wheezing, rhonchi or rales.   Abdominal:      General: Abdomen is flat. Bowel sounds are normal.      Palpations: Abdomen is soft. There is no mass.      Tenderness: There is no abdominal tenderness. There is no guarding.   Musculoskeletal:         General: No swelling or tenderness. Normal range of motion.      Cervical back: Normal range of motion and neck supple.   Skin:     General: Skin is warm and dry.      Capillary Refill: Capillary refill takes less than 2 seconds.      Findings: No rash.   Neurological:      General: No focal deficit present.      Mental Status: He is alert and oriented to person, place, and time. Mental status is at baseline.   Psychiatric:         Mood and Affect: Mood normal.         Behavior: Behavior normal. Behavior is cooperative.         Thought Content: Thought content normal.         Judgment: Judgment normal.           ED  Course & MDM   Diagnoses as of 10/08/24 0520   Acute chest pain   Chronic atrial fibrillation (Multi)                 No data recorded     [place] Coma Scale Score: 15 (10/08/24 0428 : Ilda Shaw RN)                           Medical Decision Making  Initial EKG interpreted by me at 0433 hrs. atrial fibrillation rate 90 QRS was 80  QTc was 504 no ischemic changes no STEMI  I have ordered lab work and a portable chest x-ray.  Patient's care turned over to Chris Sales PA-C        Procedure  Procedures     Huy Nicholson PA-C  10/08/24 0537

## 2024-10-08 NOTE — PROGRESS NOTES
Emergency Medicine Transition of Care Note    I received Duane H Morgan Jr. in signout from Huy Nicholson.  Please see the previous ED provider note for all HPI, PE and MDM up to the time of signout at 6 AM . This is in addition to the primary record.    In brief Duane H Morgan Jr. is an 59 y.o. male presenting for   Chief Complaint   Patient presents with    Chest Pain     Chest pain off and on since yesterday, woke me up in the middle of the night, feels like AFib     At the time of signout we were awaiting: pending laboratory studies, reevaluation disposition    ED Course as of 10/08/24 0737   Tue Oct 08, 2024   0531 HPI      Patient presented for evaluation of palpitations.  Patient states around 1 in the morning started nose sensation of palpitations in his chest.  Patient notes a history of atrial fibrillation and states this felt similar.  Patient has had a runny nose recently.  Patient notes mild shortness of breath.         Physical Exam     Appearance: Awake and alert, not in distress.  Skin: No rash or lesions, warm and dry  Eyes: Pupils equal and reactive, EOMI  ENT: Mucous membranes moist dentition without lesions. Pharynx clear, uvula midline.  Neck: Supple, no meningeal signs.  Pulmonary: CTA bilaterally with good air movement  Cardiac: Normal rate, irregularly irregular rhythm.  Radial and DP Pulses equal BL  Abdomen: Soft and nontender, nondistended, no mass.  Genitourinary: No flank tenderness.  Musculoskeletal: No signs of trauma, strong pulses.  No tenderness along the venous system.  Neurological: Clear speech, NIH 0. CN II-XII grossly intact, no focal neuro deficit  Psychiatric: Appropriate mood and affect.        Medical Decision Making:     Pulses equal.  O2 sat within normal's on room air.  Patient takes Eliquis and Plavix.    EKG:     Atrial fibrillation.  ST changes seen on previous EKG.      I personally saw the patient and made/approved the management plan and take responsibility for  the patient management.      Disclaimer: This note was dictated by speech recognition. Minor errors in transcription may be present.  Please call if questions.   [JA]      ED Course User Index  [JA] Wilbur Todd DO         Diagnoses as of 10/08/24 0737   Acute chest pain   Chronic atrial fibrillation (Multi)       Medical Decision Making  EKG: My EKG interpretation, 69 bpm, atrial fibrillation, no ST elevations, no STEMI    When reevaluated the patient.  Patient is feeling much better.  Based off patient's heart score I did offer the patient admission to the hospital for further evaluation.  Patient declines would like to go home and get some rest as he has been up since 1 AM.  Will discharge patient home we did discuss close return precautions to the emergency department.    Historian is the patient    Diagnosis: Acute chest pain, chronic atrial fibrillation        Final diagnoses:   [R07.9] Acute chest pain   [I48.20] Chronic atrial fibrillation (Multi)     Labs Reviewed   CBC WITH AUTO DIFFERENTIAL - Abnormal       Result Value    WBC 5.9      nRBC 0.0      RBC 5.30      Hemoglobin 17.6 (*)     Hematocrit 50.1      MCV 95      MCH 33.2      MCHC 35.1      RDW 13.1      Platelets 150      Neutrophils % 63.0      Immature Granulocytes %, Automated 0.2      Lymphocytes % 20.5      Monocytes % 14.1      Eosinophils % 1.5      Basophils % 0.7      Neutrophils Absolute 3.72      Immature Granulocytes Absolute, Automated 0.01      Lymphocytes Absolute 1.21      Monocytes Absolute 0.83      Eosinophils Absolute 0.09      Basophils Absolute 0.04     COMPREHENSIVE METABOLIC PANEL - Abnormal    Glucose 123 (*)     Sodium 133 (*)     Potassium 3.5      Chloride 100      Bicarbonate 24      Anion Gap 13      Urea Nitrogen 7      Creatinine 0.75      eGFR >90      Calcium 9.0      Albumin 4.1      Alkaline Phosphatase 86      Total Protein 7.0      AST 21      Bilirubin, Total 1.5 (*)     ALT 15     MAGNESIUM - Abnormal     Magnesium 1.55 (*)    SERIAL TROPONIN-INITIAL - Normal    Troponin I, High Sensitivity 10      Narrative:     Less than 99th percentile of normal range cutoff-  Female and children under 18 years old <14 ng/L; Male <21 ng/L: Negative  Repeat testing should be performed if clinically indicated.     Female and children under 18 years old 14-50 ng/L; Male 21-50 ng/L:  Consistent with possible cardiac damage and possible increased clinical   risk. Serial measurements may help to assess extent of myocardial damage.     >50 ng/L: Consistent with cardiac damage, increased clinical risk and  myocardial infarction. Serial measurements may help assess extent of   myocardial damage.      NOTE: Children less than 1 year old may have higher baseline troponin   levels and results should be interpreted in conjunction with the overall   clinical context.     NOTE: Troponin I testing is performed using a different   testing methodology at [facility] than at other   [facility]. Direct result comparisons should only   be made within the same method.   SERIAL TROPONIN, 1 HOUR - Normal    Troponin I, High Sensitivity 9      Narrative:     Less than 99th percentile of normal range cutoff-  Female and children under 18 years old <14 ng/L; Male <21 ng/L: Negative  Repeat testing should be performed if clinically indicated.     Female and children under 18 years old 14-50 ng/L; Male 21-50 ng/L:  Consistent with possible cardiac damage and possible increased clinical   risk. Serial measurements may help to assess extent of myocardial damage.     >50 ng/L: Consistent with cardiac damage, increased clinical risk and  myocardial infarction. Serial measurements may help assess extent of   myocardial damage.      NOTE: Children less than 1 year old may have higher baseline troponin   levels and results should be interpreted in conjunction with the overall   clinical context.     NOTE: Troponin I testing is performed using a  different   testing methodology at [facility] than at other   [facility]. Direct result comparisons should only   be made within the same method.   TROPONIN SERIES- (INITIAL, 1 HR)    Narrative:     The following orders were created for panel order Troponin I Series, High Sensitivity (0, 1 HR).  Procedure                               Abnormality         Status                     ---------                               -----------         ------                     Troponin I, High Sensiti...[[id number]]  Normal              Final result               Troponin, High Sensitivi...[[id number]]  Normal              Final result                 Please view results for these tests on the individual orders.   GRAY TOP    Extra Tube Hold for add-ons.          XR chest 1 view   Final Result   1.  No focal consolidation.                  MACRO:   None        Signed by: Jyoti Akers 10/8/2024 6:06 AM   Dictation workstation:   [id number]                Procedure  Procedures    Chris Sales PA-C

## 2024-10-23 ENCOUNTER — APPOINTMENT (OUTPATIENT)
Dept: CARDIOLOGY | Facility: CLINIC | Age: 59
End: 2024-10-23
Payer: COMMERCIAL

## 2024-10-23 VITALS
HEART RATE: 86 BPM | HEIGHT: 68 IN | BODY MASS INDEX: 27.13 KG/M2 | SYSTOLIC BLOOD PRESSURE: 130 MMHG | WEIGHT: 179 LBS | DIASTOLIC BLOOD PRESSURE: 82 MMHG

## 2024-10-23 DIAGNOSIS — Z51.81 ANTICOAGULATION MANAGEMENT ENCOUNTER: ICD-10-CM

## 2024-10-23 DIAGNOSIS — Z79.01 ANTICOAGULATION MANAGEMENT ENCOUNTER: ICD-10-CM

## 2024-10-23 DIAGNOSIS — I48.11 LONGSTANDING PERSISTENT ATRIAL FIBRILLATION (MULTI): Primary | ICD-10-CM

## 2024-10-23 DIAGNOSIS — R00.2 PALPITATIONS: ICD-10-CM

## 2024-10-23 DIAGNOSIS — R94.31 ABNORMAL EKG: ICD-10-CM

## 2024-10-23 DIAGNOSIS — Z87.891 FORMER SMOKER: ICD-10-CM

## 2024-10-23 PROCEDURE — 99214 OFFICE O/P EST MOD 30 MIN: CPT | Performed by: INTERNAL MEDICINE

## 2024-10-23 PROCEDURE — 1036F TOBACCO NON-USER: CPT | Performed by: INTERNAL MEDICINE

## 2024-10-23 PROCEDURE — 3075F SYST BP GE 130 - 139MM HG: CPT | Performed by: INTERNAL MEDICINE

## 2024-10-23 PROCEDURE — 3008F BODY MASS INDEX DOCD: CPT | Performed by: INTERNAL MEDICINE

## 2024-10-23 PROCEDURE — 3079F DIAST BP 80-89 MM HG: CPT | Performed by: INTERNAL MEDICINE

## 2024-10-23 PROCEDURE — 93000 ELECTROCARDIOGRAM COMPLETE: CPT | Performed by: INTERNAL MEDICINE

## 2024-10-23 ASSESSMENT — ENCOUNTER SYMPTOMS
PALPITATIONS: 1
DYSPNEA ON EXERTION: 0

## 2024-10-23 NOTE — PATIENT INSTRUCTIONS
Continue same medications/treatment.  Patient educated on proper medication use.  Patient educated on risk factor modification.  Please bring any lab results from other providers/physicians to your next appointment.    Please bring all medicines, vitamins, and herbal supplements with you when you come to the office.    Prescriptions will not be filled unless you are compliant with your follow up appointments or have a follow up appointment scheduled as per instruction of your physician. Refills should be requested at the time of your visit.    SCHEDULE 48 hour holter monitor   Follow up with Dr. Jones in 6 months    Amber NAIR RN, AM SCRIBING FOR, AND IN THE PRESENCE OF DR. IRMA JONES MD

## 2024-10-23 NOTE — PROGRESS NOTES
CARDIOLOGY OFFICE VISIT      CHIEF COMPLAINT  Chief Complaint   Patient presents with    Follow-up     Overdue follow up, LOV 2/2024       HISTORY OF PRESENT ILLNESS  HPI  59-year-old  male who is followed for permanent atrial fibrillation on rate control strategy with carvedilol, diltiazem, magnesium oxide, and anticoagulated with Eliquis.    Since the last office visit, he was doing well but he still noticing episodes of palpitations for the last few days.  Palpitations sometimes lasting for few minutes of duration.  No chest pain.  No shortness of breath.    EKG performed today shows atrial fibrillation at a rate of 86 bpm QRS duration 84 ms QT corrected 481 ms.  Rhythm strip shows the same pattern.        Past Medical History  Past Medical History:   Diagnosis Date    Atrial fibrillation (Multi)     Hypertension     Overweight 01/19/2022    Overweight    Personal history of other endocrine, nutritional and metabolic disease     History of obesity       Social History  Social History     Tobacco Use    Smoking status: Former     Current packs/day: 0.00     Types: Cigarettes     Quit date: 2014     Years since quitting: 10.8    Smokeless tobacco: Never   Substance Use Topics    Alcohol use: Yes     Comment: 4x a year    Drug use: Never       Family History     Family History   Problem Relation Name Age of Onset    Cancer Father      Hyperlipidemia Father      Other (ICD in place) Father      Other (arteriosclerotic cardiovascular disease) Father          Allergies:  No Known Allergies     Outpatient Medications:  Current Outpatient Medications   Medication Instructions    albuterol sulfate (Proair Digihaler) 90 mcg/actuation aero powdr breath act w/sensor inhaler 2 puffs, Every 6 hours PRN    amLODIPine (NORVASC) 10 mg, oral, Nightly    atorvastatin (LIPITOR) 40 mg, oral, Nightly    carvedilol (COREG) 25 mg, oral, 2 times daily    clopidogrel (Plavix) 75 mg tablet 1 tablet, Daily    dilTIAZem CD (CARDIZEM  CD) 240 mg, oral, Daily    Eliquis 5 mg, oral, 2 times daily    hydrALAZINE (APRESOLINE) 100 mg, 3 times daily    magnesium oxide (Mag-Ox) 400 mg (241.3 mg magnesium) tablet 1 tablet, 2 times daily    potassium chloride CR 20 mEq ER tablet 20 mEq, 2 times daily    valsartan (DIOVAN) 320 mg, oral, Daily          REVIEW OF SYSTEMS  Review of Systems   Cardiovascular:  Positive for palpitations. Negative for chest pain and dyspnea on exertion.   All other systems reviewed and are negative.        VITALS  Vitals:    10/23/24 1521   BP: 130/82   Pulse: 86       PHYSICAL EXAM  Constitutional:       General: Awake.      Appearance: Normal and healthy appearance. Well-developed and not in distress.   Neck:      Vascular: No JVR. JVD normal.   Pulmonary:      Effort: Pulmonary effort is normal.      Breath sounds: Normal breath sounds. No wheezing. No rhonchi. No rales.   Chest:      Chest wall: Not tender to palpatation.   Cardiovascular:      PMI at left midclavicular line. Normal rate. Irregularly irregular rhythm. Normal S1. Normal S2.       Murmurs: There is no murmur.      No gallop.  No click. No rub.      Comments: Atrial fibrillation   Pulses:     Intact distal pulses.   Edema:     Peripheral edema absent.   Abdominal:      Tenderness: There is no abdominal tenderness.   Musculoskeletal: Normal range of motion.         General: No tenderness. Skin:     General: Skin is warm and dry.   Neurological:      General: No focal deficit present.      Mental Status: Alert and oriented to person, place and time.           ASSESSMENT AND PLAN     Clinical impressions:  1.  Permanent atrial fibrillation with slow ventricular response on diltiazem, carvedilol, magnesium oxide and anticoagulated with Eliquis. Failure of sotalol to control the arrhythmia.  2. Coronary artery disease status post angioplasty with drug-eluting stent to the proximal LAD on May 12, 2021 and 40% distal RCA stenosis managed medically.  Myoview stress  test dated August 17, 2023 revealing no acute ischemic changes or infarct patterns with an ejection fraction of 56%.  3. Normal left ventricular function per 2D echocardiogram dated April 9, 2021.  4. Mild dilation of the ascending aorta per 2D echocardiogram dated April 9, 2021.  5. Dyslipidemia on statin.  6. Hypertension, .  7. Overweight with a BMI of 27.32.    Plan recommendations    Will order a Holter monitor for 48 hours for evaluation of rate control during atrial fibrillation.  He may benefit of adjustment of calcium channel blocker therapy or beta-blocker therapy or adding digoxin therapy depends on the results of the Holter monitor.    Follow my office in 6 months or sooner needed.    Continue with anticoagulant therapy with Eliquis.    Risk factor modification and lifestyle modification discussed with patient. Diet , exercise and hydration discussed with patient.    I have personally review with patient during this office visit, laboratory data, echocardiogram results, stress test results, Holter-event monitor results prior and after the last electrophysiology visit. All questions has been answered.    Please excuse any errors in grammar or translation related to this dictation.  Voice recognition software was utilized to prepare this document.

## 2024-11-04 ENCOUNTER — APPOINTMENT (OUTPATIENT)
Dept: CARDIOLOGY | Facility: CLINIC | Age: 59
End: 2024-11-04
Payer: COMMERCIAL

## 2024-11-04 DIAGNOSIS — I48.11 LONGSTANDING PERSISTENT ATRIAL FIBRILLATION (MULTI): ICD-10-CM

## 2024-11-04 DIAGNOSIS — R00.2 PALPITATIONS: ICD-10-CM

## 2024-11-04 PROCEDURE — 93227 XTRNL ECG REC<48 HR R&I: CPT | Performed by: INTERNAL MEDICINE

## 2024-11-04 PROCEDURE — 93225 XTRNL ECG REC<48 HRS REC: CPT | Performed by: INTERNAL MEDICINE

## 2024-11-14 DIAGNOSIS — I48.11 LONGSTANDING PERSISTENT ATRIAL FIBRILLATION (MULTI): ICD-10-CM

## 2024-11-14 RX ORDER — APIXABAN 5 MG/1
5 TABLET, FILM COATED ORAL 2 TIMES DAILY
Qty: 180 TABLET | Refills: 3 | Status: SHIPPED | OUTPATIENT
Start: 2024-11-14

## 2024-11-14 NOTE — TELEPHONE ENCOUNTER
Received request for prescription refills for patient.   Patient follows with Dr. Garcia    Request is for Eliquis  Is patient currently on medication yes    Last OV 10/23/2024  Next OV -none pending needs follow up, sent to secretaries

## 2024-12-09 ENCOUNTER — APPOINTMENT (OUTPATIENT)
Dept: RADIOLOGY | Facility: HOSPITAL | Age: 59
End: 2024-12-09
Payer: COMMERCIAL

## 2024-12-09 ENCOUNTER — APPOINTMENT (OUTPATIENT)
Dept: CARDIOLOGY | Facility: HOSPITAL | Age: 59
End: 2024-12-09
Payer: COMMERCIAL

## 2024-12-09 ENCOUNTER — HOSPITAL ENCOUNTER (INPATIENT)
Facility: HOSPITAL | Age: 59
LOS: 1 days | Discharge: HOME | End: 2024-12-11
Attending: EMERGENCY MEDICINE | Admitting: HOSPITALIST
Payer: COMMERCIAL

## 2024-12-09 DIAGNOSIS — I48.91 ATRIAL FIBRILLATION WITH RVR (MULTI): Primary | ICD-10-CM

## 2024-12-09 LAB
ALBUMIN SERPL BCP-MCNC: 4 G/DL (ref 3.4–5)
ALP SERPL-CCNC: 89 U/L (ref 33–136)
ALT SERPL W P-5'-P-CCNC: 12 U/L (ref 10–52)
ANION GAP SERPL CALC-SCNC: 16 MMOL/L (ref 10–20)
AST SERPL W P-5'-P-CCNC: 18 U/L (ref 9–39)
BASOPHILS # BLD AUTO: 0.02 X10*3/UL (ref 0–0.1)
BASOPHILS NFR BLD AUTO: 0.3 %
BILIRUB SERPL-MCNC: 1.1 MG/DL (ref 0–1.2)
BNP SERPL-MCNC: 617 PG/ML (ref 0–99)
BUN SERPL-MCNC: 8 MG/DL (ref 6–23)
CALCIUM SERPL-MCNC: 8.6 MG/DL (ref 8.6–10.3)
CARDIAC TROPONIN I PNL SERPL HS: 11 NG/L (ref 0–20)
CARDIAC TROPONIN I PNL SERPL HS: 9 NG/L (ref 0–20)
CHLORIDE SERPL-SCNC: 100 MMOL/L (ref 98–107)
CO2 SERPL-SCNC: 23 MMOL/L (ref 21–32)
CREAT SERPL-MCNC: 1.04 MG/DL (ref 0.5–1.3)
EGFRCR SERPLBLD CKD-EPI 2021: 82 ML/MIN/1.73M*2
EOSINOPHIL # BLD AUTO: 0.1 X10*3/UL (ref 0–0.7)
EOSINOPHIL NFR BLD AUTO: 1.4 %
ERYTHROCYTE [DISTWIDTH] IN BLOOD BY AUTOMATED COUNT: 13.2 % (ref 11.5–14.5)
FLUAV RNA RESP QL NAA+PROBE: NOT DETECTED
FLUBV RNA RESP QL NAA+PROBE: NOT DETECTED
GLUCOSE SERPL-MCNC: 99 MG/DL (ref 74–99)
HCT VFR BLD AUTO: 49.1 % (ref 41–52)
HGB BLD-MCNC: 17.4 G/DL (ref 13.5–17.5)
HOLD SPECIMEN: NORMAL
IMM GRANULOCYTES # BLD AUTO: 0.03 X10*3/UL (ref 0–0.7)
IMM GRANULOCYTES NFR BLD AUTO: 0.4 % (ref 0–0.9)
INR PPP: 1.5 (ref 0.9–1.1)
LACTATE SERPL-SCNC: 1.1 MMOL/L (ref 0.4–2)
LYMPHOCYTES # BLD AUTO: 0.96 X10*3/UL (ref 1.2–4.8)
LYMPHOCYTES NFR BLD AUTO: 13.7 %
MAGNESIUM SERPL-MCNC: 1.56 MG/DL (ref 1.6–2.4)
MCH RBC QN AUTO: 33.5 PG (ref 26–34)
MCHC RBC AUTO-ENTMCNC: 35.4 G/DL (ref 32–36)
MCV RBC AUTO: 95 FL (ref 80–100)
MONOCYTES # BLD AUTO: 1.33 X10*3/UL (ref 0.1–1)
MONOCYTES NFR BLD AUTO: 19 %
NEUTROPHILS # BLD AUTO: 4.56 X10*3/UL (ref 1.2–7.7)
NEUTROPHILS NFR BLD AUTO: 65.2 %
NRBC BLD-RTO: 0 /100 WBCS (ref 0–0)
PLATELET # BLD AUTO: 151 X10*3/UL (ref 150–450)
POTASSIUM SERPL-SCNC: 3.7 MMOL/L (ref 3.5–5.3)
PROT SERPL-MCNC: 7.3 G/DL (ref 6.4–8.2)
PROTHROMBIN TIME: 16.4 SECONDS (ref 9.8–12.8)
RBC # BLD AUTO: 5.19 X10*6/UL (ref 4.5–5.9)
SARS-COV-2 RNA RESP QL NAA+PROBE: NOT DETECTED
SODIUM SERPL-SCNC: 135 MMOL/L (ref 136–145)
WBC # BLD AUTO: 7 X10*3/UL (ref 4.4–11.3)

## 2024-12-09 PROCEDURE — 2550000001 HC RX 255 CONTRASTS: Performed by: PHYSICIAN ASSISTANT

## 2024-12-09 PROCEDURE — 85610 PROTHROMBIN TIME: CPT | Performed by: PHYSICIAN ASSISTANT

## 2024-12-09 PROCEDURE — 2500000002 HC RX 250 W HCPCS SELF ADMINISTERED DRUGS (ALT 637 FOR MEDICARE OP, ALT 636 FOR OP/ED): Performed by: NURSE PRACTITIONER

## 2024-12-09 PROCEDURE — 71045 X-RAY EXAM CHEST 1 VIEW: CPT | Mod: FOREIGN READ | Performed by: RADIOLOGY

## 2024-12-09 PROCEDURE — 2500000004 HC RX 250 GENERAL PHARMACY W/ HCPCS (ALT 636 FOR OP/ED): Performed by: NURSE PRACTITIONER

## 2024-12-09 PROCEDURE — G0378 HOSPITAL OBSERVATION PER HR: HCPCS

## 2024-12-09 PROCEDURE — 2500000005 HC RX 250 GENERAL PHARMACY W/O HCPCS: Performed by: NURSE PRACTITIONER

## 2024-12-09 PROCEDURE — 36415 COLL VENOUS BLD VENIPUNCTURE: CPT | Performed by: PHYSICIAN ASSISTANT

## 2024-12-09 PROCEDURE — 96361 HYDRATE IV INFUSION ADD-ON: CPT

## 2024-12-09 PROCEDURE — 2500000001 HC RX 250 WO HCPCS SELF ADMINISTERED DRUGS (ALT 637 FOR MEDICARE OP): Performed by: FAMILY MEDICINE

## 2024-12-09 PROCEDURE — 87040 BLOOD CULTURE FOR BACTERIA: CPT | Mod: ELYLAB | Performed by: FAMILY MEDICINE

## 2024-12-09 PROCEDURE — 83880 ASSAY OF NATRIURETIC PEPTIDE: CPT | Performed by: PHYSICIAN ASSISTANT

## 2024-12-09 PROCEDURE — 84484 ASSAY OF TROPONIN QUANT: CPT | Performed by: PHYSICIAN ASSISTANT

## 2024-12-09 PROCEDURE — 96366 THER/PROPH/DIAG IV INF ADDON: CPT

## 2024-12-09 PROCEDURE — 71045 X-RAY EXAM CHEST 1 VIEW: CPT

## 2024-12-09 PROCEDURE — 94640 AIRWAY INHALATION TREATMENT: CPT

## 2024-12-09 PROCEDURE — 99223 1ST HOSP IP/OBS HIGH 75: CPT | Performed by: FAMILY MEDICINE

## 2024-12-09 PROCEDURE — 2500000004 HC RX 250 GENERAL PHARMACY W/ HCPCS (ALT 636 FOR OP/ED): Performed by: FAMILY MEDICINE

## 2024-12-09 PROCEDURE — 2500000002 HC RX 250 W HCPCS SELF ADMINISTERED DRUGS (ALT 637 FOR MEDICARE OP, ALT 636 FOR OP/ED): Performed by: FAMILY MEDICINE

## 2024-12-09 PROCEDURE — 2500000004 HC RX 250 GENERAL PHARMACY W/ HCPCS (ALT 636 FOR OP/ED): Performed by: PHYSICIAN ASSISTANT

## 2024-12-09 PROCEDURE — 83605 ASSAY OF LACTIC ACID: CPT | Performed by: PHYSICIAN ASSISTANT

## 2024-12-09 PROCEDURE — 80053 COMPREHEN METABOLIC PANEL: CPT | Performed by: PHYSICIAN ASSISTANT

## 2024-12-09 PROCEDURE — 2500000002 HC RX 250 W HCPCS SELF ADMINISTERED DRUGS (ALT 637 FOR MEDICARE OP, ALT 636 FOR OP/ED): Performed by: INTERNAL MEDICINE

## 2024-12-09 PROCEDURE — 83735 ASSAY OF MAGNESIUM: CPT | Performed by: PHYSICIAN ASSISTANT

## 2024-12-09 PROCEDURE — 87636 SARSCOV2 & INF A&B AMP PRB: CPT | Performed by: PHYSICIAN ASSISTANT

## 2024-12-09 PROCEDURE — 85025 COMPLETE CBC W/AUTO DIFF WBC: CPT | Performed by: PHYSICIAN ASSISTANT

## 2024-12-09 PROCEDURE — 71275 CT ANGIOGRAPHY CHEST: CPT

## 2024-12-09 PROCEDURE — 93005 ELECTROCARDIOGRAM TRACING: CPT

## 2024-12-09 PROCEDURE — 71275 CT ANGIOGRAPHY CHEST: CPT | Mod: FOREIGN READ | Performed by: RADIOLOGY

## 2024-12-09 PROCEDURE — 96367 TX/PROPH/DG ADDL SEQ IV INF: CPT

## 2024-12-09 PROCEDURE — 99291 CRITICAL CARE FIRST HOUR: CPT | Mod: 25 | Performed by: EMERGENCY MEDICINE

## 2024-12-09 PROCEDURE — 96365 THER/PROPH/DIAG IV INF INIT: CPT

## 2024-12-09 RX ORDER — HYDRALAZINE HYDROCHLORIDE 50 MG/1
100 TABLET, FILM COATED ORAL 3 TIMES DAILY
Status: DISCONTINUED | OUTPATIENT
Start: 2024-12-09 | End: 2024-12-11 | Stop reason: HOSPADM

## 2024-12-09 RX ORDER — ACETAMINOPHEN 325 MG/1
650 TABLET ORAL EVERY 4 HOURS PRN
Status: DISCONTINUED | OUTPATIENT
Start: 2024-12-09 | End: 2024-12-11 | Stop reason: HOSPADM

## 2024-12-09 RX ORDER — DILTIAZEM HYDROCHLORIDE 240 MG/1
240 CAPSULE, COATED, EXTENDED RELEASE ORAL DAILY
Status: DISCONTINUED | OUTPATIENT
Start: 2024-12-10 | End: 2024-12-11 | Stop reason: HOSPADM

## 2024-12-09 RX ORDER — CARVEDILOL 12.5 MG/1
25 TABLET ORAL 2 TIMES DAILY
Status: DISCONTINUED | OUTPATIENT
Start: 2024-12-09 | End: 2024-12-11 | Stop reason: HOSPADM

## 2024-12-09 RX ORDER — SOTALOL HYDROCHLORIDE 120 MG/1
120 TABLET ORAL EVERY 12 HOURS
Status: DISCONTINUED | OUTPATIENT
Start: 2024-12-09 | End: 2024-12-10

## 2024-12-09 RX ORDER — IPRATROPIUM BROMIDE AND ALBUTEROL SULFATE 2.5; .5 MG/3ML; MG/3ML
3 SOLUTION RESPIRATORY (INHALATION) 4 TIMES DAILY PRN
Status: DISCONTINUED | OUTPATIENT
Start: 2024-12-09 | End: 2024-12-10

## 2024-12-09 RX ORDER — POTASSIUM CHLORIDE 20 MEQ/1
40 TABLET, EXTENDED RELEASE ORAL ONCE
Status: COMPLETED | OUTPATIENT
Start: 2024-12-09 | End: 2024-12-09

## 2024-12-09 RX ORDER — VALSARTAN 160 MG/1
320 TABLET ORAL DAILY
Status: DISCONTINUED | OUTPATIENT
Start: 2024-12-10 | End: 2024-12-11 | Stop reason: HOSPADM

## 2024-12-09 RX ORDER — CEFTRIAXONE 1 G/50ML
1 INJECTION, SOLUTION INTRAVENOUS DAILY
Status: DISCONTINUED | OUTPATIENT
Start: 2024-12-09 | End: 2024-12-10

## 2024-12-09 RX ORDER — ATORVASTATIN CALCIUM 20 MG/1
40 TABLET, FILM COATED ORAL NIGHTLY
Status: DISCONTINUED | OUTPATIENT
Start: 2024-12-09 | End: 2024-12-11 | Stop reason: HOSPADM

## 2024-12-09 RX ORDER — DILTIAZEM HYDROCHLORIDE 5 MG/ML
20 INJECTION INTRAVENOUS ONCE
Status: COMPLETED | OUTPATIENT
Start: 2024-12-09 | End: 2024-12-09

## 2024-12-09 RX ORDER — SOTALOL HYDROCHLORIDE 80 MG/1
1.5 TABLET ORAL EVERY 12 HOURS
Status: ON HOLD | COMMUNITY
End: 2024-12-10 | Stop reason: WASHOUT

## 2024-12-09 RX ORDER — POTASSIUM CHLORIDE 20 MEQ/1
20 TABLET, EXTENDED RELEASE ORAL 2 TIMES DAILY
Status: DISCONTINUED | OUTPATIENT
Start: 2024-12-09 | End: 2024-12-11 | Stop reason: HOSPADM

## 2024-12-09 RX ORDER — MAGNESIUM SULFATE HEPTAHYDRATE 40 MG/ML
2 INJECTION, SOLUTION INTRAVENOUS ONCE
Status: COMPLETED | OUTPATIENT
Start: 2024-12-09 | End: 2024-12-09

## 2024-12-09 RX ORDER — LANOLIN ALCOHOL/MO/W.PET/CERES
400 CREAM (GRAM) TOPICAL 2 TIMES DAILY
Status: DISCONTINUED | OUTPATIENT
Start: 2024-12-09 | End: 2024-12-11 | Stop reason: HOSPADM

## 2024-12-09 RX ORDER — AMLODIPINE BESYLATE 5 MG/1
10 TABLET ORAL NIGHTLY
Status: DISCONTINUED | OUTPATIENT
Start: 2024-12-09 | End: 2024-12-11 | Stop reason: HOSPADM

## 2024-12-09 RX ORDER — ACETAMINOPHEN 160 MG/5ML
650 SOLUTION ORAL EVERY 4 HOURS PRN
Status: DISCONTINUED | OUTPATIENT
Start: 2024-12-09 | End: 2024-12-11 | Stop reason: HOSPADM

## 2024-12-09 SDOH — SOCIAL STABILITY: SOCIAL INSECURITY: DO YOU FEEL UNSAFE GOING BACK TO THE PLACE WHERE YOU ARE LIVING?: NO

## 2024-12-09 SDOH — SOCIAL STABILITY: SOCIAL INSECURITY: DO YOU FEEL ANYONE HAS EXPLOITED OR TAKEN ADVANTAGE OF YOU FINANCIALLY OR OF YOUR PERSONAL PROPERTY?: NO

## 2024-12-09 SDOH — HEALTH STABILITY: MENTAL HEALTH: HOW OFTEN DO YOU HAVE A DRINK CONTAINING ALCOHOL?: MONTHLY OR LESS

## 2024-12-09 SDOH — ECONOMIC STABILITY: INCOME INSECURITY: IN THE PAST 12 MONTHS HAS THE ELECTRIC, GAS, OIL, OR WATER COMPANY THREATENED TO SHUT OFF SERVICES IN YOUR HOME?: NO

## 2024-12-09 SDOH — SOCIAL STABILITY: SOCIAL INSECURITY: ABUSE: ADULT

## 2024-12-09 SDOH — SOCIAL STABILITY: SOCIAL INSECURITY
WITHIN THE LAST YEAR, HAVE YOU BEEN KICKED, HIT, SLAPPED, OR OTHERWISE PHYSICALLY HURT BY YOUR PARTNER OR EX-PARTNER?: NO

## 2024-12-09 SDOH — HEALTH STABILITY: MENTAL HEALTH: HOW MANY DRINKS CONTAINING ALCOHOL DO YOU HAVE ON A TYPICAL DAY WHEN YOU ARE DRINKING?: 1 OR 2

## 2024-12-09 SDOH — SOCIAL STABILITY: SOCIAL INSECURITY
WITHIN THE LAST YEAR, HAVE YOU BEEN RAPED OR FORCED TO HAVE ANY KIND OF SEXUAL ACTIVITY BY YOUR PARTNER OR EX-PARTNER?: NO

## 2024-12-09 SDOH — SOCIAL STABILITY: SOCIAL INSECURITY: WITHIN THE LAST YEAR, HAVE YOU BEEN HUMILIATED OR EMOTIONALLY ABUSED IN OTHER WAYS BY YOUR PARTNER OR EX-PARTNER?: NO

## 2024-12-09 SDOH — ECONOMIC STABILITY: FOOD INSECURITY: WITHIN THE PAST 12 MONTHS, YOU WORRIED THAT YOUR FOOD WOULD RUN OUT BEFORE YOU GOT THE MONEY TO BUY MORE.: NEVER TRUE

## 2024-12-09 SDOH — SOCIAL STABILITY: SOCIAL INSECURITY: WITHIN THE LAST YEAR, HAVE YOU BEEN AFRAID OF YOUR PARTNER OR EX-PARTNER?: NO

## 2024-12-09 SDOH — ECONOMIC STABILITY: FOOD INSECURITY: WITHIN THE PAST 12 MONTHS, THE FOOD YOU BOUGHT JUST DIDN'T LAST AND YOU DIDN'T HAVE MONEY TO GET MORE.: NEVER TRUE

## 2024-12-09 SDOH — SOCIAL STABILITY: SOCIAL INSECURITY: DOES ANYONE TRY TO KEEP YOU FROM HAVING/CONTACTING OTHER FRIENDS OR DOING THINGS OUTSIDE YOUR HOME?: NO

## 2024-12-09 SDOH — SOCIAL STABILITY: SOCIAL INSECURITY: HAS ANYONE EVER THREATENED TO HURT YOUR FAMILY OR YOUR PETS?: NO

## 2024-12-09 SDOH — SOCIAL STABILITY: SOCIAL INSECURITY: ARE YOU OR HAVE YOU BEEN THREATENED OR ABUSED PHYSICALLY, EMOTIONALLY, OR SEXUALLY BY ANYONE?: NO

## 2024-12-09 SDOH — SOCIAL STABILITY: SOCIAL INSECURITY: WERE YOU ABLE TO COMPLETE ALL THE BEHAVIORAL HEALTH SCREENINGS?: YES

## 2024-12-09 SDOH — HEALTH STABILITY: MENTAL HEALTH: HOW OFTEN DO YOU HAVE SIX OR MORE DRINKS ON ONE OCCASION?: NEVER

## 2024-12-09 SDOH — SOCIAL STABILITY: SOCIAL INSECURITY: ARE THERE ANY APPARENT SIGNS OF INJURIES/BEHAVIORS THAT COULD BE RELATED TO ABUSE/NEGLECT?: NO

## 2024-12-09 SDOH — SOCIAL STABILITY: SOCIAL INSECURITY: HAVE YOU HAD THOUGHTS OF HARMING ANYONE ELSE?: NO

## 2024-12-09 SDOH — SOCIAL STABILITY: SOCIAL INSECURITY: HAVE YOU HAD ANY THOUGHTS OF HARMING ANYONE ELSE?: NO

## 2024-12-09 ASSESSMENT — ACTIVITIES OF DAILY LIVING (ADL)
DRESSING YOURSELF: INDEPENDENT
ADEQUATE_TO_COMPLETE_ADL: YES
WALKS IN HOME: INDEPENDENT
PATIENT'S MEMORY ADEQUATE TO SAFELY COMPLETE DAILY ACTIVITIES?: YES
TOILETING: INDEPENDENT
LACK_OF_TRANSPORTATION: NO
BATHING: INDEPENDENT
FEEDING YOURSELF: INDEPENDENT
HEARING - RIGHT EAR: FUNCTIONAL
GROOMING: INDEPENDENT
HEARING - LEFT EAR: FUNCTIONAL
JUDGMENT_ADEQUATE_SAFELY_COMPLETE_DAILY_ACTIVITIES: YES
ASSISTIVE_DEVICE: EYEGLASSES

## 2024-12-09 ASSESSMENT — COGNITIVE AND FUNCTIONAL STATUS - GENERAL
DAILY ACTIVITIY SCORE: 24
MOBILITY SCORE: 24
MOBILITY SCORE: 24
DAILY ACTIVITIY SCORE: 24
PATIENT BASELINE BEDBOUND: NO

## 2024-12-09 ASSESSMENT — PAIN DESCRIPTION - LOCATION: LOCATION: OTHER (COMMENT)

## 2024-12-09 ASSESSMENT — PAIN DESCRIPTION - FREQUENCY: FREQUENCY: CONSTANT/CONTINUOUS

## 2024-12-09 ASSESSMENT — LIFESTYLE VARIABLES
SKIP TO QUESTIONS 9-10: 1
AUDIT-C TOTAL SCORE: 1

## 2024-12-09 ASSESSMENT — COLUMBIA-SUICIDE SEVERITY RATING SCALE - C-SSRS
1. IN THE PAST MONTH, HAVE YOU WISHED YOU WERE DEAD OR WISHED YOU COULD GO TO SLEEP AND NOT WAKE UP?: NO
2. HAVE YOU ACTUALLY HAD ANY THOUGHTS OF KILLING YOURSELF?: NO
6. HAVE YOU EVER DONE ANYTHING, STARTED TO DO ANYTHING, OR PREPARED TO DO ANYTHING TO END YOUR LIFE?: NO

## 2024-12-09 ASSESSMENT — PAIN SCALES - GENERAL
PAINLEVEL_OUTOF10: 0 - NO PAIN
PAINLEVEL_OUTOF10: 7
PAINLEVEL_OUTOF10: 0 - NO PAIN

## 2024-12-09 ASSESSMENT — PAIN - FUNCTIONAL ASSESSMENT
PAIN_FUNCTIONAL_ASSESSMENT: 0-10

## 2024-12-09 ASSESSMENT — PAIN DESCRIPTION - PAIN TYPE: TYPE: ACUTE PAIN

## 2024-12-09 ASSESSMENT — PATIENT HEALTH QUESTIONNAIRE - PHQ9
SUM OF ALL RESPONSES TO PHQ9 QUESTIONS 1 & 2: 0
2. FEELING DOWN, DEPRESSED OR HOPELESS: NOT AT ALL
1. LITTLE INTEREST OR PLEASURE IN DOING THINGS: NOT AT ALL

## 2024-12-09 ASSESSMENT — PAIN DESCRIPTION - DESCRIPTORS: DESCRIPTORS: ACHING

## 2024-12-09 NOTE — ED PROCEDURE NOTE
Procedure  Critical Care    Performed by: Ashkan PORTER MD  Authorized by: Ashkan PORTER MD    Critical care provider statement:     Critical care time (minutes):  32    Critical care time was exclusive of:  Separately billable procedures and treating other patients    Critical care was necessary to treat or prevent imminent or life-threatening deterioration of the following conditions:  Cardiac failure (Dysrhythmia)    Critical care was time spent personally by me on the following activities:  Blood draw for specimens, discussions with primary provider, evaluation of patient's response to treatment, examination of patient, obtaining history from patient or surrogate, ordering and performing treatments and interventions, ordering and review of laboratory studies, ordering and review of radiographic studies, pulse oximetry and re-evaluation of patient's condition    Care discussed with: admitting provider                 Ashkan PORTER MD  12/09/24 1192

## 2024-12-09 NOTE — PROGRESS NOTES
Emergency Medicine Transition of Care Note.    I received Duane H Morgan Jr. in signout from PEEWEE Turner. Please see the previous ED provider note for all HPI, PE and MDM up to the time of signout at 0600. This is in addition to the primary record.    In brief Duane H Morgan Jr. is an 59 y.o. male presenting for   Chief Complaint   Patient presents with    Cough     I've been up all night coughing, my head hurts, my stomach hurts, my chest hurts from all this coughing     At the time of signout we were awaiting: PE Study, reeval of heart rate    Diagnoses as of 12/09/24 0808   Atrial fibrillation with RVR (Multi)       Medical Decision Making  59-year-old male presents emergency departments, patient states last night started coughing, describes significant coughing episodes for several hours last night, coughed up a lot of phlegm.  States after that became short of breath, feeling unwell.  States this morning continued to feel that way, got up and took his morning meds including his oral Cardizem and his Eliquis, presented to the department.    Was found to be in A-fib with RVR, CT is chronically in A-fib but typically his heart rate is controlled.    EKG at 6:08 AM with ventricular rate of 116, as interpreted by me, shows A-fib RVR, nonspecific ST and T wave normalities noted, no evidence of acute ischemia or other acute findings.    Patient was started on some IV fluids to see if this helps his heart rate, CBC unremarkable, normal lactate level, mag 1.56 and potassium 3.7, both were repleted, BNP slightly elevated to 617 with initial troponin of 17.  Negative troponin viral swabs.    Sent for PE study by the overnight PA, showed no evidence of pulmonary embolism, no evidence of acute thoracic pathology.    After IV fluids and repletion of his electrolytes he remains with RVR even at rest, him concerned that any ambulation or exertion will worsen that as well.    I did also notice a run of PVCs, x 5.    Feel  the patient would benefit from IV Cardizem for further rate control, hospitalization for monitoring of his heart rate and rhythm, cardiology consults.  Will discuss with hospitalist.        Final diagnoses:   [I48.91] Atrial fibrillation with RVR (Multi)     Labs Reviewed   CBC WITH AUTO DIFFERENTIAL - Abnormal       Result Value    WBC 7.0      nRBC 0.0      RBC 5.19      Hemoglobin 17.4      Hematocrit 49.1      MCV 95      MCH 33.5      MCHC 35.4      RDW 13.2      Platelets 151      Neutrophils % 65.2      Immature Granulocytes %, Automated 0.4      Lymphocytes % 13.7      Monocytes % 19.0      Eosinophils % 1.4      Basophils % 0.3      Neutrophils Absolute 4.56      Immature Granulocytes Absolute, Automated 0.03      Lymphocytes Absolute 0.96 (*)     Monocytes Absolute 1.33 (*)     Eosinophils Absolute 0.10      Basophils Absolute 0.02     COMPREHENSIVE METABOLIC PANEL - Abnormal    Glucose 99      Sodium 135 (*)     Potassium 3.7      Chloride 100      Bicarbonate 23      Anion Gap 16      Urea Nitrogen 8      Creatinine 1.04      eGFR 82      Calcium 8.6      Albumin 4.0      Alkaline Phosphatase 89      Total Protein 7.3      AST 18      Bilirubin, Total 1.1      ALT 12     MAGNESIUM - Abnormal    Magnesium 1.56 (*)    PROTIME-INR - Abnormal    Protime 16.4 (*)     INR 1.5 (*)    B-TYPE NATRIURETIC PEPTIDE - Abnormal     (*)     Narrative:        <100 pg/mL - Heart failure unlikely  100-299 pg/mL - Intermediate probability of acute heart                  failure exacerbation. Correlate with clinical                  context and patient history.    >=300 pg/mL - Heart Failure likely. Correlate with clinical                  context and patient history.    BNP testing is performed using different testing methodology at [facility] than at other [facility] hospitals. Direct result comparisons should only be made within the same method.      LACTATE - Normal    Lactate 1.1      Narrative:      Venipuncture immediately after or during the administration of Metamizole may lead to falsely low results. Testing should be performed immediately prior to Metamizole dosing.   SARS-COV-2 AND INFLUENZA A/B PCR - Normal    Flu A Result Not Detected      Flu B Result Not Detected      Coronavirus 2019, PCR Not Detected      Narrative:     This assay has received FDA Emergency Use Authorization (EUA) and  is only authorized for the duration of time that circumstances exist to justify the authorization of the emergency use of in vitro diagnostic tests for the detection of SARS-CoV-2 virus and/or diagnosis of COVID-19 infection under section 564(b)(1) of the Act, 21 U.S.C. 360bbb-3(b)(1). Testing for SARS-CoV-2 is only recommended for patients who meet current clinical and/or epidemiological criteria as defined by federal, state, or local public health directives. This assay is an in vitro diagnostic nucleic acid amplification test for the qualitative detection of SARS-CoV-2, Influenza A, and Influenza B from nasopharyngeal specimens and has been validated for use at [facility]. Negative results do not preclude COVID-19 infections or Influenza A/B infections, and should not be used as the sole basis for diagnosis, treatment, or other management decisions. If Influenza A/B and RSV PCR results are negative, testing for Parainfluenza virus, Adenovirus and Metapneumovirus is routinely performed for [facility] [place] [place] inpatients, and is available on other patients by placing an add-on request.    SERIAL TROPONIN-INITIAL - Normal    Troponin I, High Sensitivity 11      Narrative:     Less than 99th percentile of normal range cutoff-  Female and children under 18 years old <14 ng/L; Male <21 ng/L: Negative  Repeat testing should be performed if clinically indicated.     Female and children under 18 years old 14-50 ng/L; Male 21-50 ng/L:  Consistent with possible cardiac damage and  possible increased clinical   risk. Serial measurements may help to assess extent of myocardial damage.     >50 ng/L: Consistent with cardiac damage, increased clinical risk and  myocardial infarction. Serial measurements may help assess extent of   myocardial damage.      NOTE: Children less than 1 year old may have higher baseline troponin   levels and results should be interpreted in conjunction with the overall   clinical context.     NOTE: Troponin I testing is performed using a different   testing methodology at [facility] than at other   [facility]. Direct result comparisons should only   be made within the same method.   SERIAL TROPONIN, 1 HOUR - Normal    Troponin I, High Sensitivity 9      Narrative:     Less than 99th percentile of normal range cutoff-  Female and children under 18 years old <14 ng/L; Male <21 ng/L: Negative  Repeat testing should be performed if clinically indicated.     Female and children under 18 years old 14-50 ng/L; Male 21-50 ng/L:  Consistent with possible cardiac damage and possible increased clinical   risk. Serial measurements may help to assess extent of myocardial damage.     >50 ng/L: Consistent with cardiac damage, increased clinical risk and  myocardial infarction. Serial measurements may help assess extent of   myocardial damage.      NOTE: Children less than 1 year old may have higher baseline troponin   levels and results should be interpreted in conjunction with the overall   clinical context.     NOTE: Troponin I testing is performed using a different   testing methodology at [facility] than at other   [facility]. Direct result comparisons should only   be made within the same method.   TROPONIN SERIES- (INITIAL, 1 HR)    Narrative:     The following orders were created for panel order Troponin I Series, High Sensitivity (0, 1 HR).  Procedure                               Abnormality         Status                     ---------                                -----------         ------                     Troponin I, High Sensiti...[[id number]]  Normal              Final result               Troponin, High Sensitivi...[[id number]]  Normal              Final result                 Please view results for these tests on the individual orders.        CT angio chest for pulmonary embolism   Final Result   No definite acute thoracic pathology identified.  No evidence of   pulmonary embolus.   Signed by Oliver Maynard      XR chest 1 view   Final Result   No acute cardiopulmonary disease.   Signed by Oliver Maynard              Procedure  Procedures    Patt Morrow, APRN-CNP

## 2024-12-09 NOTE — ED PROVIDER NOTES
HPI   Chief Complaint   Patient presents with    Cough     I've been up all night coughing, my head hurts, my stomach hurts, my chest hurts from all this coughing       A 59-year-old male patient comes into the emergency department today with complaints of feelings of shortness of breath, cough, headaches.  States he feels like he cannot take a deep breath in.  States just all of not feeling well for this purpose comes in the emergency department today for the evaluation.  Discussed with this started around 10 PM last night.  States he did not get much sleep because of it.  States he has persistent A-fib, hypertension.              Patient History   Past Medical History:   Diagnosis Date    Atrial fibrillation (Multi)     Hypertension     Overweight 01/19/2022    Overweight    Personal history of other endocrine, nutritional and metabolic disease     History of obesity     Past Surgical History:   Procedure Laterality Date    OTHER SURGICAL HISTORY  10/16/2021    Cardiac catheterization with stent placement    OTHER SURGICAL HISTORY  10/16/2021    Inguinal hernia repair    OTHER SURGICAL HISTORY  08/09/2022    Cardioversion     Family History   Problem Relation Name Age of Onset    Cancer Father      Hyperlipidemia Father      Other (ICD in place) Father      Other (arteriosclerotic cardiovascular disease) Father       Social History     Tobacco Use    Smoking status: Former     Current packs/day: 0.00     Types: Cigarettes     Quit date: 2014     Years since quitting: 10.9    Smokeless tobacco: Never   Substance Use Topics    Alcohol use: Yes     Comment: 4x a year    Drug use: Never       Physical Exam   ED Triage Vitals [12/09/24 0437]   Temperature Heart Rate Respirations BP   36.3 °C (97.3 °F) (!) 116 18 136/84      Pulse Ox Temp Source Heart Rate Source Patient Position   (!) 93 % Temporal Monitor Sitting      BP Location FiO2 (%)     Right arm --       Physical Exam      ED Course & MDM                  No  data recorded     Marquise Coma Scale Score: 15 (12/09/24 0436 : Ilda Shaw RN)                           Medical Decision Making  A 59-year-old male patient comes into the emergency department today with complaints of feelings of shortness of breath, cough, headaches.  States he feels like he cannot take a deep breath in.  States just all of not feeling well for this purpose comes in the emergency department today for the evaluation.  Discussed with this started around 10 PM last night.  States he did not get much sleep because of it.  States he has persistent A-fib, hypertension.    EKG, chest x-ray, laboratory studies ordered to rule ACS, arrhythmia, electrolyte abnormality, leukocytosis, acute kidney injury, pneumonia, pulmonary congestion, pleural effusions, CT PE study rule pulmonary emboli as well as testing for COVID-19, influenza.    IV fluids ordered for the patient    Patient negative for influenza, COVID-19 , metabolic panel within normal limits, magnesium 1.56, no leukocytosis or left shift, chest x-ray negative.    Handoff to Patt Morrow pending CT PE study, reevaluation disposition      Labs Reviewed   CBC WITH AUTO DIFFERENTIAL - Abnormal       Result Value    WBC 7.0      nRBC 0.0      RBC 5.19      Hemoglobin 17.4      Hematocrit 49.1      MCV 95      MCH 33.5      MCHC 35.4      RDW 13.2      Platelets 151      Neutrophils % 65.2      Immature Granulocytes %, Automated 0.4      Lymphocytes % 13.7      Monocytes % 19.0      Eosinophils % 1.4      Basophils % 0.3      Neutrophils Absolute 4.56      Immature Granulocytes Absolute, Automated 0.03      Lymphocytes Absolute 0.96 (*)     Monocytes Absolute 1.33 (*)     Eosinophils Absolute 0.10      Basophils Absolute 0.02     COMPREHENSIVE METABOLIC PANEL - Abnormal    Glucose 99      Sodium 135 (*)     Potassium 3.7      Chloride 100      Bicarbonate 23      Anion Gap 16      Urea Nitrogen 8      Creatinine 1.04      eGFR 82      Calcium  8.6      Albumin 4.0      Alkaline Phosphatase 89      Total Protein 7.3      AST 18      Bilirubin, Total 1.1      ALT 12     MAGNESIUM - Abnormal    Magnesium 1.56 (*)    PROTIME-INR - Abnormal    Protime 16.4 (*)     INR 1.5 (*)    B-TYPE NATRIURETIC PEPTIDE - Abnormal     (*)     Narrative:        <100 pg/mL - Heart failure unlikely  100-299 pg/mL - Intermediate probability of acute heart                  failure exacerbation. Correlate with clinical                  context and patient history.    >=300 pg/mL - Heart Failure likely. Correlate with clinical                  context and patient history.    BNP testing is performed using different testing methodology at [facility] than at other [facility]. Direct result comparisons should only be made within the same method.      LACTATE - Normal    Lactate 1.1      Narrative:     Venipuncture immediately after or during the administration of Metamizole may lead to falsely low results. Testing should be performed immediately prior to Metamizole dosing.   SARS-COV-2 AND INFLUENZA A/B PCR - Normal    Flu A Result Not Detected      Flu B Result Not Detected      Coronavirus 2019, PCR Not Detected      Narrative:     This assay has received FDA Emergency Use Authorization (EUA) and  is only authorized for the duration of time that circumstances exist to justify the authorization of the emergency use of in vitro diagnostic tests for the detection of SARS-CoV-2 virus and/or diagnosis of COVID-19 infection under section 564(b)(1) of the Act, 21 U.S.C. 360bbb-3(b)(1). Testing for SARS-CoV-2 is only recommended for patients who meet current clinical and/or epidemiological criteria as defined by federal, state, or local public health directives. This assay is an in vitro diagnostic nucleic acid amplification test for the qualitative detection of SARS-CoV-2, Influenza A, and Influenza B from nasopharyngeal specimens and has been validated for use  at [facility]. Negative results do not preclude COVID-19 infections or Influenza A/B infections, and should not be used as the sole basis for diagnosis, treatment, or other management decisions. If Influenza A/B and RSV PCR results are negative, testing for Parainfluenza virus, Adenovirus and Metapneumovirus is routinely performed for [facility] pediatric oncology and intensive care inpatients, and is available on other patients by placing an add-on request.    SERIAL TROPONIN-INITIAL - Normal    Troponin I, High Sensitivity 11      Narrative:     Less than 99th percentile of normal range cutoff-  Female and children under 18 years old <14 ng/L; Male <21 ng/L: Negative  Repeat testing should be performed if clinically indicated.     Female and children under 18 years old 14-50 ng/L; Male 21-50 ng/L:  Consistent with possible cardiac damage and possible increased clinical   risk. Serial measurements may help to assess extent of myocardial damage.     >50 ng/L: Consistent with cardiac damage, increased clinical risk and  myocardial infarction. Serial measurements may help assess extent of   myocardial damage.      NOTE: Children less than 1 year old may have higher baseline troponin   levels and results should be interpreted in conjunction with the overall   clinical context.     NOTE: Troponin I testing is performed using a different   testing methodology at [facility] than at [facility]. Direct result comparisons should only   be made within the same method.   TROPONIN SERIES- (INITIAL, 1 HR)    Narrative:     The following orders were created for panel order Troponin I Series, High Sensitivity (0, 1 HR).  Procedure                               Abnormality         Status                     ---------                               -----------         ------                     Troponin I, High Sensiti...[[id number]]  Normal              Final result               Troponin,  High Sensitivi...[[id number]]                                                   Please view results for these tests on the individual orders.   SERIAL TROPONIN, 1 HOUR        XR chest 1 view   Final Result   No acute cardiopulmonary disease.   Signed by Oliver Maynard      CT angio chest for pulmonary embolism    (Results Pending)       Procedure  ECG 12 lead    Performed by: Chris Sales PA-C  Authorized by: Chris Sales PA-C    Interpretation:     Details:  My EKG interpretation  Rate:     ECG rate:  125  Rhythm:     Rhythm: atrial fibrillation    ST segments:     ST segments:  Normal  T waves:     T waves: normal         Chris Sales PA-C  12/09/24 0604

## 2024-12-09 NOTE — H&P
History Of Present Illness  Duane H Morgan Jr. is a 59 y.o. male presenting with shortness of breath.  The patient has a history of A-fib, was admitted in October for A-fib with RVR and has been taking Cardizem and Coreg as prescribed.  The patient started to feel short of breath today, worse with exertion.  Symptoms did not improve and he came in for evaluation.  He was noted to be in A-fib with RVR and was started on Cardizem drip.  He is admitted for further management     Past Medical History  He has a past medical history of Atrial fibrillation (Multi), Hypertension, Overweight (01/19/2022), and Personal history of other endocrine, nutritional and metabolic disease.    Surgical History  He has a past surgical history that includes Other surgical history (10/16/2021); Other surgical history (10/16/2021); and Other surgical history (08/09/2022).     Social History  He reports that he quit smoking about 10 years ago. His smoking use included cigarettes. He has never used smokeless tobacco. He reports current alcohol use. He reports that he does not use drugs.    Family History  Family History   Problem Relation Name Age of Onset    Cancer Father      Hyperlipidemia Father      Other (ICD in place) Father      Other (arteriosclerotic cardiovascular disease) Father          Allergies  Patient has no known allergies.    Review of Systems  As in history of present illness, otherwise negative    Physical Exam  Alert oriented x 3  Lungs clear to auscultation bilaterally  Heart regular rhythm, tachycardia  Abdomen soft nontender  Extremities no leg edema  CNS no focal deficit    Last Recorded Vitals  /78   Pulse 74   Temp 36.3 °C (97.3 °F) (Temporal)   Resp 20   Wt 81.6 kg (180 lb)   SpO2 (!) 93% Comment: Pt placed on 2 L NC    Relevant Results  Results for orders placed or performed during the hospital encounter of 12/09/24 (from the past 24 hours)   Sars-CoV-2 and Influenza A/B PCR   Result Value Ref Range     Flu A Result Not Detected Not Detected    Flu B Result Not Detected Not Detected    Coronavirus 2019, PCR Not Detected Not Detected   CBC and Auto Differential   Result Value Ref Range    WBC 7.0 4.4 - 11.3 x10*3/uL    nRBC 0.0 0.0 - 0.0 /100 WBCs    RBC 5.19 4.50 - 5.90 x10*6/uL    Hemoglobin 17.4 13.5 - 17.5 g/dL    Hematocrit 49.1 41.0 - 52.0 %    MCV 95 80 - 100 fL    MCH 33.5 26.0 - 34.0 pg    MCHC 35.4 32.0 - 36.0 g/dL    RDW 13.2 11.5 - 14.5 %    Platelets 151 150 - 450 x10*3/uL    Neutrophils % 65.2 40.0 - 80.0 %    Immature Granulocytes %, Automated 0.4 0.0 - 0.9 %    Lymphocytes % 13.7 13.0 - 44.0 %    Monocytes % 19.0 2.0 - 10.0 %    Eosinophils % 1.4 0.0 - 6.0 %    Basophils % 0.3 0.0 - 2.0 %    Neutrophils Absolute 4.56 1.20 - 7.70 x10*3/uL    Immature Granulocytes Absolute, Automated 0.03 0.00 - 0.70 x10*3/uL    Lymphocytes Absolute 0.96 (L) 1.20 - 4.80 x10*3/uL    Monocytes Absolute 1.33 (H) 0.10 - 1.00 x10*3/uL    Eosinophils Absolute 0.10 0.00 - 0.70 x10*3/uL    Basophils Absolute 0.02 0.00 - 0.10 x10*3/uL   Comprehensive Metabolic Panel   Result Value Ref Range    Glucose 99 74 - 99 mg/dL    Sodium 135 (L) 136 - 145 mmol/L    Potassium 3.7 3.5 - 5.3 mmol/L    Chloride 100 98 - 107 mmol/L    Bicarbonate 23 21 - 32 mmol/L    Anion Gap 16 10 - 20 mmol/L    Urea Nitrogen 8 6 - 23 mg/dL    Creatinine 1.04 0.50 - 1.30 mg/dL    eGFR 82 >60 mL/min/1.73m*2    Calcium 8.6 8.6 - 10.3 mg/dL    Albumin 4.0 3.4 - 5.0 g/dL    Alkaline Phosphatase 89 33 - 136 U/L    Total Protein 7.3 6.4 - 8.2 g/dL    AST 18 9 - 39 U/L    Bilirubin, Total 1.1 0.0 - 1.2 mg/dL    ALT 12 10 - 52 U/L   Magnesium   Result Value Ref Range    Magnesium 1.56 (L) 1.60 - 2.40 mg/dL   Lactate   Result Value Ref Range    Lactate 1.1 0.4 - 2.0 mmol/L   Protime-INR   Result Value Ref Range    Protime 16.4 (H) 9.8 - 12.8 seconds    INR 1.5 (H) 0.9 - 1.1   B-Type Natriuretic Peptide   Result Value Ref Range     (H) 0 - 99 pg/mL    Troponin I, High Sensitivity, Initial   Result Value Ref Range    Troponin I, High Sensitivity 11 0 - 20 ng/L   ECG 12 lead   Result Value Ref Range    Ventricular Rate 125 BPM    Atrial Rate 105 BPM    QRS Duration 76 ms    QT Interval 328 ms    QTC Calculation(Bazett) 473 ms    R Axis 95 degrees    T Axis 162 degrees    QRS Count 21 beats    Q Onset 226 ms    T Offset 390 ms    QTC Fredericia 418 ms   Troponin, High Sensitivity, 1 Hour   Result Value Ref Range    Troponin I, High Sensitivity 9 0 - 20 ng/L   ECG 12 lead   Result Value Ref Range    Ventricular Rate 116 BPM    Atrial Rate 163 BPM    QRS Duration 82 ms    QT Interval 352 ms    QTC Calculation(Bazett) 489 ms    R Axis 75 degrees    T Axis 67 degrees    QRS Count 19 beats    Q Onset 225 ms    T Offset 401 ms    QTC Fredericia 438 ms          Assessment/Plan   Chronic A-fib with rapid ventricular response  Hypertension, dyslipidemia    Plan:  IV Cardizem, consult cardiology  Continue Eliquis  Continuous telemetry monitoring    Nely Hayes MD

## 2024-12-09 NOTE — CARE PLAN
Problem: Pain - Adult  Goal: Verbalizes/displays adequate comfort level or baseline comfort level  Outcome: Progressing     Problem: Safety - Adult  Goal: Free from fall injury  Outcome: Progressing     Problem: Discharge Planning  Goal: Discharge to home or other facility with appropriate resources  Outcome: Progressing     Problem: Chronic Conditions and Co-morbidities  Goal: Patient's chronic conditions and co-morbidity symptoms are monitored and maintained or improved  Outcome: Progressing     Problem: Arrythmia/Dysrhythmia  Goal: Lab values return to normal range  Outcome: Progressing  Goal: No evidence of post procedure complications  Outcome: Progressing  Goal: Promote self management  Outcome: Progressing  Goal: Serial ECG will return to baseline  Outcome: Progressing  Goal: Verbalize understanding of procedures/devices  Outcome: Progressing  Goal: Vital signs return to baseline  Outcome: Progressing  Goal: Care and maintenance of device (specify)  Outcome: Progressing

## 2024-12-10 ENCOUNTER — HOSPITAL ENCOUNTER (OUTPATIENT)
Dept: CARDIOLOGY | Facility: HOSPITAL | Age: 59
Setting detail: OBSERVATION
Discharge: HOME | End: 2024-12-10
Payer: COMMERCIAL

## 2024-12-10 LAB
ANION GAP SERPL CALC-SCNC: 11 MMOL/L (ref 10–20)
APPEARANCE UR: CLEAR
ATRIAL RATE: 105 BPM
ATRIAL RATE: 163 BPM
ATRIAL RATE: 88 BPM
BILIRUB UR STRIP.AUTO-MCNC: NEGATIVE MG/DL
BUN SERPL-MCNC: 8 MG/DL (ref 6–23)
CALCIUM SERPL-MCNC: 8.8 MG/DL (ref 8.6–10.3)
CHLORIDE SERPL-SCNC: 98 MMOL/L (ref 98–107)
CO2 SERPL-SCNC: 27 MMOL/L (ref 21–32)
COLOR UR: NORMAL
CREAT SERPL-MCNC: 1.02 MG/DL (ref 0.5–1.3)
EGFRCR SERPLBLD CKD-EPI 2021: 84 ML/MIN/1.73M*2
GLUCOSE SERPL-MCNC: 145 MG/DL (ref 74–99)
GLUCOSE UR STRIP.AUTO-MCNC: NORMAL MG/DL
KETONES UR STRIP.AUTO-MCNC: NEGATIVE MG/DL
LEUKOCYTE ESTERASE UR QL STRIP.AUTO: NEGATIVE
MAGNESIUM SERPL-MCNC: 1.99 MG/DL (ref 1.6–2.4)
NITRITE UR QL STRIP.AUTO: NEGATIVE
PH UR STRIP.AUTO: 5.5 [PH]
POTASSIUM SERPL-SCNC: 4 MMOL/L (ref 3.5–5.3)
PROT UR STRIP.AUTO-MCNC: NEGATIVE MG/DL
Q ONSET: 224 MS
Q ONSET: 225 MS
Q ONSET: 226 MS
QRS COUNT: 13 BEATS
QRS COUNT: 19 BEATS
QRS COUNT: 21 BEATS
QRS DURATION: 76 MS
QRS DURATION: 82 MS
QRS DURATION: 82 MS
QT INTERVAL: 328 MS
QT INTERVAL: 352 MS
QT INTERVAL: 406 MS
QTC CALCULATION(BAZETT): 459 MS
QTC CALCULATION(BAZETT): 473 MS
QTC CALCULATION(BAZETT): 489 MS
QTC FREDERICIA: 418 MS
QTC FREDERICIA: 438 MS
QTC FREDERICIA: 441 MS
R AXIS: 75 DEGREES
R AXIS: 83 DEGREES
R AXIS: 95 DEGREES
RBC # UR STRIP.AUTO: NEGATIVE /UL
SODIUM SERPL-SCNC: 132 MMOL/L (ref 136–145)
SP GR UR STRIP.AUTO: 1.01
T AXIS: 162 DEGREES
T AXIS: 65 DEGREES
T AXIS: 67 DEGREES
T OFFSET: 390 MS
T OFFSET: 401 MS
T OFFSET: 427 MS
UROBILINOGEN UR STRIP.AUTO-MCNC: NORMAL MG/DL
VENTRICULAR RATE: 116 BPM
VENTRICULAR RATE: 125 BPM
VENTRICULAR RATE: 77 BPM

## 2024-12-10 PROCEDURE — 93005 ELECTROCARDIOGRAM TRACING: CPT

## 2024-12-10 PROCEDURE — 2500000004 HC RX 250 GENERAL PHARMACY W/ HCPCS (ALT 636 FOR OP/ED): Performed by: HOSPITALIST

## 2024-12-10 PROCEDURE — 2500000002 HC RX 250 W HCPCS SELF ADMINISTERED DRUGS (ALT 637 FOR MEDICARE OP, ALT 636 FOR OP/ED): Performed by: FAMILY MEDICINE

## 2024-12-10 PROCEDURE — 81003 URINALYSIS AUTO W/O SCOPE: CPT | Performed by: HOSPITALIST

## 2024-12-10 PROCEDURE — 83735 ASSAY OF MAGNESIUM: CPT | Performed by: NURSE PRACTITIONER

## 2024-12-10 PROCEDURE — 2500000004 HC RX 250 GENERAL PHARMACY W/ HCPCS (ALT 636 FOR OP/ED): Performed by: FAMILY MEDICINE

## 2024-12-10 PROCEDURE — 2500000002 HC RX 250 W HCPCS SELF ADMINISTERED DRUGS (ALT 637 FOR MEDICARE OP, ALT 636 FOR OP/ED): Performed by: HOSPITALIST

## 2024-12-10 PROCEDURE — 80048 BASIC METABOLIC PNL TOTAL CA: CPT | Performed by: NURSE PRACTITIONER

## 2024-12-10 PROCEDURE — 36415 COLL VENOUS BLD VENIPUNCTURE: CPT | Performed by: NURSE PRACTITIONER

## 2024-12-10 PROCEDURE — 93010 ELECTROCARDIOGRAM REPORT: CPT | Performed by: INTERNAL MEDICINE

## 2024-12-10 PROCEDURE — 2500000001 HC RX 250 WO HCPCS SELF ADMINISTERED DRUGS (ALT 637 FOR MEDICARE OP): Performed by: FAMILY MEDICINE

## 2024-12-10 PROCEDURE — 94640 AIRWAY INHALATION TREATMENT: CPT

## 2024-12-10 PROCEDURE — 99223 1ST HOSP IP/OBS HIGH 75: CPT | Performed by: INTERNAL MEDICINE

## 2024-12-10 PROCEDURE — 99232 SBSQ HOSP IP/OBS MODERATE 35: CPT | Performed by: HOSPITALIST

## 2024-12-10 PROCEDURE — 1100000001 HC PRIVATE ROOM DAILY

## 2024-12-10 PROCEDURE — 2500000002 HC RX 250 W HCPCS SELF ADMINISTERED DRUGS (ALT 637 FOR MEDICARE OP, ALT 636 FOR OP/ED): Performed by: INTERNAL MEDICINE

## 2024-12-10 RX ORDER — DILTIAZEM HYDROCHLORIDE 30 MG/1
60 TABLET, FILM COATED ORAL EVERY 6 HOURS
Status: DISCONTINUED | OUTPATIENT
Start: 2024-12-10 | End: 2024-12-10

## 2024-12-10 RX ORDER — METOPROLOL TARTRATE 1 MG/ML
5 INJECTION, SOLUTION INTRAVENOUS EVERY 6 HOURS PRN
Status: DISCONTINUED | OUTPATIENT
Start: 2024-12-10 | End: 2024-12-11 | Stop reason: HOSPADM

## 2024-12-10 RX ORDER — FUROSEMIDE 10 MG/ML
20 INJECTION INTRAMUSCULAR; INTRAVENOUS ONCE
Status: COMPLETED | OUTPATIENT
Start: 2024-12-10 | End: 2024-12-10

## 2024-12-10 RX ORDER — IPRATROPIUM BROMIDE AND ALBUTEROL SULFATE 2.5; .5 MG/3ML; MG/3ML
3 SOLUTION RESPIRATORY (INHALATION)
Status: DISCONTINUED | OUTPATIENT
Start: 2024-12-10 | End: 2024-12-11

## 2024-12-10 ASSESSMENT — COGNITIVE AND FUNCTIONAL STATUS - GENERAL
MOBILITY SCORE: 24
MOBILITY SCORE: 24
DAILY ACTIVITIY SCORE: 24
DAILY ACTIVITIY SCORE: 24

## 2024-12-10 ASSESSMENT — PAIN SCALES - GENERAL
PAINLEVEL_OUTOF10: 0 - NO PAIN
PAINLEVEL_OUTOF10: 0 - NO PAIN

## 2024-12-10 ASSESSMENT — ENCOUNTER SYMPTOMS
COUGH: 1
SHORTNESS OF BREATH: 1

## 2024-12-10 ASSESSMENT — ACTIVITIES OF DAILY LIVING (ADL): LACK_OF_TRANSPORTATION: NO

## 2024-12-10 ASSESSMENT — PAIN - FUNCTIONAL ASSESSMENT: PAIN_FUNCTIONAL_ASSESSMENT: 0-10

## 2024-12-10 NOTE — PROGRESS NOTES
12/10/24 1049   Discharge Planning   Living Arrangements Alone   Support Systems Friends/neighbors   Assistance Needed none   Type of Residence Private residence   Who is requesting discharge planning? Provider   Home or Post Acute Services None   Expected Discharge Disposition Home   Does the patient need discharge transport arranged? No   Transportation Needs   In the past 12 months, has lack of transportation kept you from medical appointments or from getting medications? no   In the past 12 months, has lack of transportation kept you from meetings, work, or from getting things needed for daily living? No   Patient Choice   Patient / Family choosing to utilize agency / facility established prior to hospitalization No   Stroke Family Assessment   Stroke Family Assessment Needed No   Intensity of Service   Intensity of Service 0-30 min     Pt admitted from home with AFIB RVR, Cardizem gtt initiated.  Spoke with patient, states he resides home alone, is independent, uses no assistive devices, works and plan is home at discharge, declines any needs.  Demographics confirmed. Pt states he is in the process of changing pharmacies from [facility] to its learning pharmacy as [facility] no longer has contract with [place]. Does not currently follow with PCP,  list to be provided.  Care transitions team will continue to follow if needs should arise.

## 2024-12-10 NOTE — PROGRESS NOTES
"Duane H Morgan Jr. is a 59 y.o. male on day 0 of admission presenting with feels better today      Subjective   Hypoxic this AM, does not feel short of breath     Objective     Last Recorded Vitals  /84 (BP Location: Right arm, Patient Position: 5 min laying)   Pulse 85   Temp 37.9 °C (100.2 °F) (Temporal)   Resp 24   Ht 1.778 m (5' 10\")   Wt 81.6 kg (180 lb)   SpO2 92%   BMI 25.83 kg/m²      Intake/Output last 3 Shifts:    Intake/Output Summary (Last 24 hours) at 12/10/2024 1221  Last data filed at 12/10/2024 1150  Gross per 24 hour   Intake 319.5 ml   Output --   Net 319.5 ml       Scheduled medications  amLODIPine, 10 mg, oral, Nightly  apixaban, 5 mg, oral, BID  atorvastatin, 40 mg, oral, Nightly  carvedilol, 25 mg, oral, BID  cefTRIAXone, 1 g, intravenous, Daily  dilTIAZem CD, 240 mg, oral, Daily  hydrALAZINE, 100 mg, oral, TID  magnesium oxide, 400 mg, oral, BID  potassium chloride CR, 20 mEq, oral, BID  valsartan, 320 mg, oral, Daily      Continuous medications     PRN medications  PRN medications: acetaminophen **OR** acetaminophen, ipratropium-albuteroL    Physical Exam   Gen: NAD  HEENT: EOM, MMM  CV: RRR, no murmurs rubs or gallops  Resp: coarse rhonchi b/l   Abdomen: soft, NT,+BS  LE: no swelling     Relevant Results  Lab Results   Component Value Date    WBC 7.0 12/09/2024    HGB 17.4 12/09/2024    HCT 49.1 12/09/2024    MCV 95 12/09/2024     12/09/2024     Lab Results   Component Value Date    GLUCOSE 145 (H) 12/10/2024    CALCIUM 8.8 12/10/2024     (L) 12/10/2024    K 4.0 12/10/2024    CO2 27 12/10/2024    CL 98 12/10/2024    BUN 8 12/10/2024    CREATININE 1.02 12/10/2024       Assessment/Plan  59 year male with history long standing afib admitted with acute hypoxic respiratory failure secondary afib with RVR    -on o2 today, CXR and CT chest negative on admission  -stopped cardizem gtt today  -nebs ordered, one time IV lasix today   -IS wean o2 as tolerated  -continue coreg "   -EP consulted     Febrile: possible, Viral URI: supportive care, CXR and CT chest ok  -BC drawn NGTD on admission  -check UA     HTN: continue home meds     DVT ppx: on eliqujassi Smith MD

## 2024-12-10 NOTE — CARE PLAN
The patient's goals for the shift include      The clinical goals for the shift include patient will be hemodynamically stable throughout shift    Problem: Safety - Adult  Goal: Free from fall injury  Outcome: Progressing

## 2024-12-10 NOTE — CONSULTS
Inpatient consult to Cardiology  Consult performed by: RICARDO Thomas-CNP  Consult ordered by: Mateo Smith MD  Reason for consult: atrial fibrillation            Cardiology Consult Note  Patient: Duane H Morgan Jr.  Unit/Bed: 809/809-A  YOB: 1965  MRN: [id number]  Acct: [id number]   Admitting Diagnosis: A-fib (Multi) [I48.91]  Atrial fibrillation with RVR (Multi) [I48.91]  Date:  12/9/2024  Hospital Day: 0  Attending: Mateo Smith MD    Consultant: Dr. Ede Garcia  / Sue SILVA - CNP  Primary Cardiologist: Dr. Ede Garcia      Complaint:  Chief Complaint   Patient presents with    Cough     I've been up all night coughing, my head hurts, my stomach hurts, my chest hurts from all this coughing        History of Present Illness:  59-year-old male evaluated in [facility] emergency room secondary to shortness of breath, cough, and headache.  States he feels like he cannot take a deep breath, and he did not get much sleep because of this.  Patient was treated with IV fluids.  He was negative for influenza and COVID-19 infection.  .  Potassium 3.7, magnesium 1.56, WBCs within normal limits, troponin 11, CT angio of the chest negative for PE, chest x-ray shows no acute cardiopulmonary disease.  Initial EKG shows atrial fibrillation with heart rate 116 bpm, nonspecific ST and T wave abnormalities.  He was also treated with IV magnesium.  After IV fluids and repletion of electrolytes he remained in RVR at rest.  He was initiated on IV Cardizem for rate control and admitted to the hospitalist service.  This a.m. he was resumed on his oral Cardizem dose telemetry shows that he remains in atrial fibrillation with heart rate 60 to 110 bpm.  IV Cardizem was discontinued.  Patient states that her shortness of breath is improved.  At this time patient does have some hypoxia and is noted to be febrile with temperature up to 38 9 overnight.  EP service was asked to see  and evaluate patient regarding atrial fibrillation.  Past medical history includes permanent atrial fibrillation on rate control strategy with Coreg, diltiazem, magnesium, and Eliquis.  Failure of dofetilide or sotalol to control rhythm and patient opted for rate control strategy in the past.  Most recent Holter monitor performed 11/4/2024 showed average heart rate of 74 bpm, lowest heart rate 41bpm, and highest heart rate 118 bpm.  Other past medical history includes asthma, long-term use of anticoagulation therapy, hyperlipidemia, hypertension, CAD with remote PCI/PRINCESS of proximal LAD, normal LVEF per PÉREZ March 2023, dyslipidemia, hypertension, and NSVT.        Allergies:  No Known Allergies     PMHx:  Past Medical History:   Diagnosis Date    Atrial fibrillation (Multi)     Hypertension     Overweight 01/19/2022    Overweight    Personal history of other endocrine, nutritional and metabolic disease     History of obesity       PSHx:  Past Surgical History:   Procedure Laterality Date    OTHER SURGICAL HISTORY  10/16/2021    Cardiac catheterization with stent placement    OTHER SURGICAL HISTORY  10/16/2021    Inguinal hernia repair    OTHER SURGICAL HISTORY  08/09/2022    Cardioversion       Social Hx:  Social History     Socioeconomic History    Marital status:    Tobacco Use    Smoking status: Former     Current packs/day: 0.00     Types: Cigarettes     Quit date: 2014     Years since quitting: 10.9    Smokeless tobacco: Never   Substance and Sexual Activity    Alcohol use: Yes     Comment: 4x a year    Drug use: Never     Social Drivers of Health     Financial Resource Strain: Low Risk  (12/9/2024)    Overall Financial Resource Strain (CARDIA)     Difficulty of Paying Living Expenses: Not hard at all   Food Insecurity: No Food Insecurity (12/9/2024)    Hunger Vital Sign     Worried About Running Out of Food in the Last Year: Never true     Ran Out of Food in the Last Year: Never true   Transportation  Needs: No Transportation Needs (12/10/2024)    PRAPARE - Transportation     Lack of Transportation (Medical): No     Lack of Transportation (Non-Medical): No   Intimate Partner Violence: Not At Risk (12/9/2024)    Humiliation, Afraid, Rape, and Kick questionnaire     Fear of Current or Ex-Partner: No     Emotionally Abused: No     Physically Abused: No     Sexually Abused: No   Housing Stability: Low Risk  (12/9/2024)    Housing Stability Vital Sign     Unable to Pay for Housing in the Last Year: No     Number of Times Moved in the Last Year: 1     Homeless in the Last Year: No       Family Hx:  Family History   Problem Relation Name Age of Onset    Cancer Father      Hyperlipidemia Father      Other (ICD in place) Father      Other (arteriosclerotic cardiovascular disease) Father         Review of Systems:   Review of Systems   Respiratory:  Positive for cough and shortness of breath.          Physical Examination:    Visit Vitals  /84 (BP Location: Right arm, Patient Position: 5 min laying)   Pulse 85   Temp 37.9 °C (100.2 °F) (Temporal)   Resp 24      Physical Exam  Constitutional:       Appearance: Normal appearance.   HENT:      Head: Normocephalic.      Nose: Nose normal.      Mouth/Throat:      Mouth: Mucous membranes are moist.   Eyes:      Pupils: Pupils are equal, round, and reactive to light.   Cardiovascular:      Rate and Rhythm: Tachycardia present. Rhythm irregular.      Pulses: Normal pulses.   Pulmonary:      Effort: Pulmonary effort is normal.      Breath sounds: Normal breath sounds.   Musculoskeletal:         General: Normal range of motion.   Skin:     General: Skin is warm and dry.   Neurological:      General: No focal deficit present.      Mental Status: He is alert and oriented to person, place, and time.   Psychiatric:         Mood and Affect: Mood normal.         Behavior: Behavior normal.         LABS:  Results for orders placed or performed during the hospital encounter of 12/09/24  (from the past 96 hours)   Sars-CoV-2 and Influenza A/B PCR   Result Value Ref Range    Flu A Result Not Detected Not Detected    Flu B Result Not Detected Not Detected    Coronavirus 2019, PCR Not Detected Not Detected   CBC and Auto Differential   Result Value Ref Range    WBC 7.0 4.4 - 11.3 x10*3/uL    nRBC 0.0 0.0 - 0.0 /100 WBCs    RBC 5.19 4.50 - 5.90 x10*6/uL    Hemoglobin 17.4 13.5 - 17.5 g/dL    Hematocrit 49.1 41.0 - 52.0 %    MCV 95 80 - 100 fL    MCH 33.5 26.0 - 34.0 pg    MCHC 35.4 32.0 - 36.0 g/dL    RDW 13.2 11.5 - 14.5 %    Platelets 151 150 - 450 x10*3/uL    Neutrophils % 65.2 40.0 - 80.0 %    Immature Granulocytes %, Automated 0.4 0.0 - 0.9 %    Lymphocytes % 13.7 13.0 - 44.0 %    Monocytes % 19.0 2.0 - 10.0 %    Eosinophils % 1.4 0.0 - 6.0 %    Basophils % 0.3 0.0 - 2.0 %    Neutrophils Absolute 4.56 1.20 - 7.70 x10*3/uL    Immature Granulocytes Absolute, Automated 0.03 0.00 - 0.70 x10*3/uL    Lymphocytes Absolute 0.96 (L) 1.20 - 4.80 x10*3/uL    Monocytes Absolute 1.33 (H) 0.10 - 1.00 x10*3/uL    Eosinophils Absolute 0.10 0.00 - 0.70 x10*3/uL    Basophils Absolute 0.02 0.00 - 0.10 x10*3/uL   Comprehensive Metabolic Panel   Result Value Ref Range    Glucose 99 74 - 99 mg/dL    Sodium 135 (L) 136 - 145 mmol/L    Potassium 3.7 3.5 - 5.3 mmol/L    Chloride 100 98 - 107 mmol/L    Bicarbonate 23 21 - 32 mmol/L    Anion Gap 16 10 - 20 mmol/L    Urea Nitrogen 8 6 - 23 mg/dL    Creatinine 1.04 0.50 - 1.30 mg/dL    eGFR 82 >60 mL/min/1.73m*2    Calcium 8.6 8.6 - 10.3 mg/dL    Albumin 4.0 3.4 - 5.0 g/dL    Alkaline Phosphatase 89 33 - 136 U/L    Total Protein 7.3 6.4 - 8.2 g/dL    AST 18 9 - 39 U/L    Bilirubin, Total 1.1 0.0 - 1.2 mg/dL    ALT 12 10 - 52 U/L   Magnesium   Result Value Ref Range    Magnesium 1.56 (L) 1.60 - 2.40 mg/dL   Lactate   Result Value Ref Range    Lactate 1.1 0.4 - 2.0 mmol/L   Protime-INR   Result Value Ref Range    Protime 16.4 (H) 9.8 - 12.8 seconds    INR 1.5 (H) 0.9 - 1.1    B-Type Natriuretic Peptide   Result Value Ref Range     (H) 0 - 99 pg/mL   Troponin I, High Sensitivity, Initial   Result Value Ref Range    Troponin I, High Sensitivity 11 0 - 20 ng/L   ECG 12 lead   Result Value Ref Range    Ventricular Rate 125 BPM    Atrial Rate 105 BPM    QRS Duration 76 ms    QT Interval 328 ms    QTC Calculation(Bazett) 473 ms    R Axis 95 degrees    T Axis 162 degrees    QRS Count 21 beats    Q Onset 226 ms    T Offset 390 ms    QTC Fredericia 418 ms   Troponin, High Sensitivity, 1 Hour   Result Value Ref Range    Troponin I, High Sensitivity 9 0 - 20 ng/L   ECG 12 lead   Result Value Ref Range    Ventricular Rate 116 BPM    Atrial Rate 163 BPM    QRS Duration 82 ms    QT Interval 352 ms    QTC Calculation(Bazett) 489 ms    R Axis 75 degrees    T Axis 67 degrees    QRS Count 19 beats    Q Onset 225 ms    T Offset 401 ms    QTC Fredericia 438 ms   Blood Culture    Specimen: Peripheral Venipuncture; Blood culture   Result Value Ref Range    Blood Culture Loaded on Instrument - Culture in progress    Blood Culture    Specimen: Peripheral Venipuncture; Blood culture   Result Value Ref Range    Blood Culture Loaded on Instrument - Culture in progress    SST TOP   Result Value Ref Range    Extra Tube Hold for add-ons.    Magnesium   Result Value Ref Range    Magnesium 1.99 1.60 - 2.40 mg/dL   Basic Metabolic Panel   Result Value Ref Range    Glucose 145 (H) 74 - 99 mg/dL    Sodium 132 (L) 136 - 145 mmol/L    Potassium 4.0 3.5 - 5.3 mmol/L    Chloride 98 98 - 107 mmol/L    Bicarbonate 27 21 - 32 mmol/L    Anion Gap 11 10 - 20 mmol/L    Urea Nitrogen 8 6 - 23 mg/dL    Creatinine 1.02 0.50 - 1.30 mg/dL    eGFR 84 >60 mL/min/1.73m*2    Calcium 8.8 8.6 - 10.3 mg/dL   ECG 12 Lead   Result Value Ref Range    Ventricular Rate 77 BPM    Atrial Rate 88 BPM    QRS Duration 82 ms    QT Interval 406 ms    QTC Calculation(Bazett) 459 ms    R Axis 83 degrees    T Axis 65 degrees    QRS Count 13 beats     Q Onset 224 ms    T Offset 427 ms    QTC Fredericia 441 ms          Current Medications:  Scheduled medications  amLODIPine, 10 mg, oral, Nightly  apixaban, 5 mg, oral, BID  atorvastatin, 40 mg, oral, Nightly  carvedilol, 25 mg, oral, BID  cefTRIAXone, 1 g, intravenous, Daily  dilTIAZem CD, 240 mg, oral, Daily  furosemide, 20 mg, intravenous, Once  hydrALAZINE, 100 mg, oral, TID  ipratropium-albuteroL, 3 mL, nebulization, 4x daily  magnesium oxide, 400 mg, oral, BID  potassium chloride CR, 20 mEq, oral, BID  valsartan, 320 mg, oral, Daily      Continuous medications     PRN medications  PRN medications: acetaminophen **OR** acetaminophen     CT head wo IV contrast    Result Date: 10/18/2023  Interpreted By:  Justen Butt, STUDY: CT HEAD WO IV CONTRAST;  10/18/2023 7:02 am   INDICATION: Signs/Symptoms:AMS.   COMPARISON: CT Brain, 4 March 2020   ACCESSION NUMBER(S): [id number]   ORDERING CLINICIAN: SANDIP WEBER   TECHNIQUE: CT of the brain from the skull vertex to the skull base, without intravenous contrast   FINDINGS: ACUTE INTRA-AXIAL HEMORRHAGE:  Negative   ACUTE EXTRA-AXIAL/SUBDURAL HEMORRHAGE:  Negative   ACUTE INTRACRANIAL MASS EFFECT:  Negative   CT EVIDENCE OF ACUTE / SUBACUTE TERRITORIAL ISCHEMIA:  Negative   VENTRICLES:  Unchanged diffuse dilation. No acute obstructive hydrocephalus by CT   OTHER BRAIN FINDINGS:  No significant interval change to the CT appearance of the brain including the degree of atrophy and deep white matter changes from chronic microvascular disease and encephalomalacia in the left occipital lobe from old infarct and another in or near the left thalamus   INCLUDED PARANASAL SINUSES: All clear   INCLUDED MASTOID AIR CELLS: All clear   SKULL:  No lytic or blastic lesion   EXTRACRANIAL SOFT TISSUES:  Scalp and occular globes grossly normal by CT       NO ACUTE INTRACRANIAL PROCESS   NO SIGNIFICANT INTERVAL CHANGE TO THE CT APPEARANCE OF THE BRAIN WHEN COMPARED TO 4 MARCH 2020   MACRO:  None   Signed by: Justen Butt 10/18/2023 7:59 AM Dictation workstation:   [id number]    CT abdomen pelvis w IV contrast    Result Date: 10/18/2023  Interpreted By:  Brianna Brenner, STUDY: CT ABDOMEN PELVIS W IV CONTRAST;  10/18/2023 12:46 am   INDICATION: Signs/Symptoms:vomiting.   COMPARISON: None.   ACCESSION NUMBER(S): [id number]   ORDERING CLINICIAN: SHAYNE HOOD   TECHNIQUE: Axial CT images of the abdomen and pelvis with coronal and sagittal reconstructed images obtained after intravenous administration of contrast   FINDINGS: LOWER CHEST: Mild bibasilar dependent atelectasis. Pacemaker/AICD leads noted the right atrium and right ventricle. Mitral annulus calcifications noted. BONES: No acute osseous abnormality. Mild multilevel degenerative disc changes. ABDOMINAL WALL: Within normal limits.   ABDOMEN:   LIVER: Within normal limits. BILE DUCTS: No biliary dilatation. GALLBLADDER: No calcified gallstones. No pericholecystic inflammatory changes. PANCREAS: Within normal limits. SPLEEN: Within normal limits. ADRENALS: Within normal limits. KIDNEYS and URETERS: Partial staghorn calculus versus numerous layering calculi in the right kidney. Mild right hydronephrosis. Mild right urothelial thickening, particularly in the distal ureter. No calculus along the course of the ureter. No parenchymal abnormality to suggest pyelonephritis. No hydronephrosis on the left.     VESSELS: No aortic aneurysm. RETROPERITONEUM: No pathologically enlarged retroperitoneal lymph nodes.   PELVIS:   REPRODUCTIVE ORGANS: No pelvic masses. BLADDER: Diffuse urinary bladder wall thickening and mucosal hyperenhancement.   BOWEL: The stomach is moderately distended. No dilated small bowel. Large amount of stool in the rectum. There is significant narrowing of the ascending colon and cecum, with the small bowel loops extending lateral to the cecum and ascending colon (series 301, image 128). The appendix is not seen with certainty. There  are no pericecal inflammatory changes to suggest acute appendicitis. PERITONEUM: No ascites or free air, no fluid collection.       Findings suspicious for cystitis with possible right ascending UTI/pyelitis.   Large amount of stool in the rectum suggesting fecal impaction.   Suspected internal pericecal hernia. No evidence of bowel obstruction.   MACRO: None   Signed by: Brianna Brenner 10/18/2023 1:42 AM Dictation workstation:   [id number]    XR chest 1 view    Result Date: 10/18/2023  Interpreted By:  Pepe Sepulveda, STUDY: XR CHEST 1 VIEW;  10/17/2023 11:16 pm   INDICATION: Signs/Symptoms:nausea and vomiting.   COMPARISON: None.   ACCESSION NUMBER(S): [id number]   ORDERING CLINICIAN: SHAYNE HOOD   FINDINGS: Dual lead left-sided pacemaker.   The cardiomediastinal silhouette and pulmonary vasculature are within normal limits. No consolidation, pleural effusion or pneumothorax.   Chronic right mid clavicle fracture.       No acute cardiopulmonary process.     MACRO: None.   Signed by: Pepe Sepulveda 10/18/2023 12:15 AM Dictation workstation:   [id number]     .No echocardiogram results found for the past 14 days     Encounter Date: 12/09/24   ECG 12 Lead   Result Value    Ventricular Rate 77    Atrial Rate 88    QRS Duration 82    QT Interval 406    QTC Calculation(Bazett) 459    R Axis 83    T Axis 65    QRS Count 13    Q Onset 224    T Offset 427    QTC Fredericia 441    Narrative    Atrial fibrillation  Nonspecific ST and T wave abnormality  Abnormal ECG  When compared with ECG of 09-DEC-2024 06:08, (unconfirmed)  Vent. rate has decreased BY  39 BPM  T wave inversion no longer evident in Inferior leads  Nonspecific T wave abnormality, improved in Lateral leads  Confirmed by Ede Garcia ([id number]) on 12/10/2024 11:58:13 AM            I/O:    Intake/Output Summary (Last 24 hours) at 12/10/2024 1314  Last data filed at 12/10/2024 1150  Gross per 24 hour   Intake 319.5 ml   Output --   Net 319.5 ml         Assessment:    Patient Active Problem List   Diagnosis    CAD (coronary artery disease)    Cerebrovascular accident (CVA) due to occlusion of small artery (Multi)    Heart failure, unspecified    Hyperlipidemia, mixed    Longstanding persistent atrial fibrillation (Multi)    Primary hypertension    Status post insertion of drug-eluting stent into left anterior descending artery for coronary artery disease    Anticoagulant long-term use    Atrial fibrillation with RVR (Multi)    Pulmonary emphysema, unspecified emphysema type (Multi)    BMI 27.0-27.9,adult    Former smoker    A-fib (Multi)   Clinical impression:  1.  Febrile illness with cough  2.  Permanent atrial fibrillation on diltiazem, carvedilol, magnesium oxide, and anticoagulation with Eliquis.  Failure of sotalol and dofetilide to control arrhythmia  3.  Coronary artery disease with remote PCI PRINCESS to the proximal LAD on May 12, 2021 and 40% distal RCA stenosis managed medically.  Myoview stress test August 17, 2023 revealed no acute ischemic changes or infarct pattern with an EF of 56%  4.  Normal LVEF per transesophageal echocardiogram March 2023 as mentioned above  5.  Dyslipidemia  6.  Hypertension    Plan:  Given febrile illness recommend increasing oral fluids  Continue carvedilol, diltiazem, magnesium oxide, and Eliquis as at home.  Will use metoprolol IV 5 mg every 6 hours as needed if heart rate above 130 bpm  Continue to monitor on telemetry  Supplemental potassium to keep K over 4 and supplement magnesium to keep mag over 2  Further recommendations per Dr. Garcia    Electronically signed by RAMBO Thomas on 12/10/2024 at 1:14 PM       Patient was seen, chart reviewed.    Patient has a history of persistent-permanent atrial fibrillation.  Rate control strategy since 2023.  Patient was admitted to emergency department for shortness of breath and headache.  He was negative for influenza and COVID-19 infection.    EKG on arrival shows  atrial fibrillation with episodes of rapid ventricular response  After IV fluids were given, patient was placed on Cardizem.  Currently atrial fibrillation with rates between 60-90 bpm.  IV Cardizem was discontinued  Agree with AV node blocking agents only  Agree with metoprolol IV every 6 hours as needed heart rates above 130 bpm  Continue telemetry  Continue anticoagulant therapy with Nuris Garcia MD

## 2024-12-11 ENCOUNTER — APPOINTMENT (OUTPATIENT)
Dept: CARDIOLOGY | Facility: HOSPITAL | Age: 59
End: 2024-12-11
Payer: COMMERCIAL

## 2024-12-11 VITALS
BODY MASS INDEX: 25.77 KG/M2 | OXYGEN SATURATION: 93 % | TEMPERATURE: 98.6 F | SYSTOLIC BLOOD PRESSURE: 130 MMHG | RESPIRATION RATE: 24 BRPM | HEIGHT: 70 IN | HEART RATE: 86 BPM | WEIGHT: 180 LBS | DIASTOLIC BLOOD PRESSURE: 80 MMHG

## 2024-12-11 LAB
ANION GAP SERPL CALC-SCNC: 12 MMOL/L (ref 10–20)
BUN SERPL-MCNC: 9 MG/DL (ref 6–23)
CALCIUM SERPL-MCNC: 8.4 MG/DL (ref 8.6–10.3)
CHLORIDE SERPL-SCNC: 98 MMOL/L (ref 98–107)
CO2 SERPL-SCNC: 24 MMOL/L (ref 21–32)
CREAT SERPL-MCNC: 0.96 MG/DL (ref 0.5–1.3)
EGFRCR SERPLBLD CKD-EPI 2021: 90 ML/MIN/1.73M*2
ERYTHROCYTE [DISTWIDTH] IN BLOOD BY AUTOMATED COUNT: 13.1 % (ref 11.5–14.5)
GLUCOSE SERPL-MCNC: 103 MG/DL (ref 74–99)
HCT VFR BLD AUTO: 48.5 % (ref 41–52)
HGB BLD-MCNC: 17 G/DL (ref 13.5–17.5)
HOLD SPECIMEN: NORMAL
MAGNESIUM SERPL-MCNC: 1.89 MG/DL (ref 1.6–2.4)
MCH RBC QN AUTO: 32.9 PG (ref 26–34)
MCHC RBC AUTO-ENTMCNC: 35.1 G/DL (ref 32–36)
MCV RBC AUTO: 94 FL (ref 80–100)
NRBC BLD-RTO: 0 /100 WBCS (ref 0–0)
PLATELET # BLD AUTO: 137 X10*3/UL (ref 150–450)
POTASSIUM SERPL-SCNC: 3.6 MMOL/L (ref 3.5–5.3)
RBC # BLD AUTO: 5.17 X10*6/UL (ref 4.5–5.9)
SODIUM SERPL-SCNC: 130 MMOL/L (ref 136–145)
WBC # BLD AUTO: 4.9 X10*3/UL (ref 4.4–11.3)

## 2024-12-11 PROCEDURE — 93010 ELECTROCARDIOGRAM REPORT: CPT | Performed by: INTERNAL MEDICINE

## 2024-12-11 PROCEDURE — 2500000002 HC RX 250 W HCPCS SELF ADMINISTERED DRUGS (ALT 637 FOR MEDICARE OP, ALT 636 FOR OP/ED): Performed by: FAMILY MEDICINE

## 2024-12-11 PROCEDURE — 99239 HOSP IP/OBS DSCHRG MGMT >30: CPT | Performed by: HOSPITALIST

## 2024-12-11 PROCEDURE — 94760 N-INVAS EAR/PLS OXIMETRY 1: CPT

## 2024-12-11 PROCEDURE — 2500000002 HC RX 250 W HCPCS SELF ADMINISTERED DRUGS (ALT 637 FOR MEDICARE OP, ALT 636 FOR OP/ED): Performed by: HOSPITALIST

## 2024-12-11 PROCEDURE — 2500000002 HC RX 250 W HCPCS SELF ADMINISTERED DRUGS (ALT 637 FOR MEDICARE OP, ALT 636 FOR OP/ED): Performed by: NURSE PRACTITIONER

## 2024-12-11 PROCEDURE — 83735 ASSAY OF MAGNESIUM: CPT | Performed by: HOSPITALIST

## 2024-12-11 PROCEDURE — 94640 AIRWAY INHALATION TREATMENT: CPT

## 2024-12-11 PROCEDURE — 85027 COMPLETE CBC AUTOMATED: CPT | Performed by: HOSPITALIST

## 2024-12-11 PROCEDURE — 2500000001 HC RX 250 WO HCPCS SELF ADMINISTERED DRUGS (ALT 637 FOR MEDICARE OP): Performed by: FAMILY MEDICINE

## 2024-12-11 PROCEDURE — 36415 COLL VENOUS BLD VENIPUNCTURE: CPT | Performed by: HOSPITALIST

## 2024-12-11 PROCEDURE — 93005 ELECTROCARDIOGRAM TRACING: CPT

## 2024-12-11 PROCEDURE — 99232 SBSQ HOSP IP/OBS MODERATE 35: CPT | Performed by: NURSE PRACTITIONER

## 2024-12-11 PROCEDURE — 80048 BASIC METABOLIC PNL TOTAL CA: CPT | Performed by: HOSPITALIST

## 2024-12-11 PROCEDURE — 2500000004 HC RX 250 GENERAL PHARMACY W/ HCPCS (ALT 636 FOR OP/ED): Performed by: FAMILY MEDICINE

## 2024-12-11 RX ORDER — ALBUTEROL SULFATE 90 UG/1
2 INHALANT RESPIRATORY (INHALATION) EVERY 6 HOURS PRN
Status: DISCONTINUED | OUTPATIENT
Start: 2024-12-11 | End: 2024-12-11 | Stop reason: HOSPADM

## 2024-12-11 RX ORDER — POTASSIUM CHLORIDE 20 MEQ/1
40 TABLET, EXTENDED RELEASE ORAL ONCE
Status: COMPLETED | OUTPATIENT
Start: 2024-12-11 | End: 2024-12-11

## 2024-12-11 ASSESSMENT — PAIN SCALES - GENERAL: PAINLEVEL_OUTOF10: 0 - NO PAIN

## 2024-12-11 ASSESSMENT — PAIN - FUNCTIONAL ASSESSMENT: PAIN_FUNCTIONAL_ASSESSMENT: 0-10

## 2024-12-11 NOTE — CARE PLAN
Problem: Pain - Adult  Goal: Verbalizes/displays adequate comfort level or baseline comfort level  Outcome: Progressing     Problem: Safety - Adult  Goal: Free from fall injury  Outcome: Progressing     Problem: Discharge Planning  Goal: Discharge to home or other facility with appropriate resources  Outcome: Progressing     Problem: Chronic Conditions and Co-morbidities  Goal: Patient's chronic conditions and co-morbidity symptoms are monitored and maintained or improved  Outcome: Progressing     Problem: Arrythmia/Dysrhythmia  Goal: Lab values return to normal range  Outcome: Progressing  Goal: No evidence of post procedure complications  Outcome: Progressing  Goal: Promote self management  Outcome: Progressing  Goal: Serial ECG will return to baseline  Outcome: Progressing  Goal: Verbalize understanding of procedures/devices  Outcome: Progressing  Goal: Vital signs return to baseline  Outcome: Progressing  Goal: Care and maintenance of device (specify)  Outcome: Progressing   The patient's goals for the shift include      The clinical goals for the shift include pt will remain hemodynamically stable throughout shift

## 2024-12-11 NOTE — DISCHARGE SUMMARY
Discharge Diagnosis  A-fib (Multi)    Issues Requiring Follow-Up    Discharge Meds     Your medication list        CONTINUE taking these medications        Instructions Last Dose Given Next Dose Due   amLODIPine 10 mg tablet  Commonly known as: Norvasc      take 1 tablet by mouth daily at bedtime       atorvastatin 40 mg tablet  Commonly known as: Lipitor      TAKE 1 TABLET BY MOUTH EVERY DAY AT BEDTIME       carvedilol 25 mg tablet  Commonly known as: Coreg      Take 1 tablet (25 mg) by mouth 2 times a day.       dilTIAZem  mg 24 hr capsule  Commonly known as: Cardizem CD      Take one capsule daily       Eliquis 5 mg tablet  Generic drug: apixaban      TAKE 1 TABLET BY MOUTH TWICE DAILY       hydrALAZINE 100 mg tablet  Commonly known as: Apresoline           magnesium oxide 400 mg (241.3 mg magnesium) tablet  Commonly known as: Mag-Ox           potassium chloride CR 20 mEq ER tablet  Commonly known as: Klor-Con M20           Proair Digihaler 90 mcg/actuation aero powdr breath act w/sensor inhaler  Generic drug: albuterol sulfate           valsartan 320 mg tablet  Commonly known as: Diovan      TAKE 1 TABLET BY MOUTH EVERY DAY                Test Results Pending At Discharge  Pending Labs       Order Current Status    Blood Culture Preliminary result    Blood Culture Preliminary result            Hospital Course  59 year male with history long standing afib admitted with acute hypoxic respiratory failure secondary afib with RVR     -on o2 today, CXR and CT chest negative on admission  -stopped cardizem gtt on 12/10  -IS wean o2 as tolerated now on RA   -continue coreg   -EP consulted recommended continue care and follow up outpatient, likely secondary to viral illness       Febrile: possible, Viral URI: supportive care, CXR and CT chest ok  -BC drawn NGTD on admission  -UA negative  - last fever yesterday evening, feels much better today, ambulating in room without difficulty, offered continue observation but  he feels much better and ready to go home    HTN: continue home meds      DVT ppx: on eliquis      Discharged home in stable condition. Advised close follow up with PCP and if fever returns to come back to ED.        Pertinent Physical Exam At Time of Discharge  Gen: NAD  HEENT: EOM, MMM  CV: irregular rhythm, no murmurs rubs or gallops  Resp: clear to auscultation b/l   Abdomen: soft, NT,+BS  LE: No edema    Outpatient Follow-Up  Future Appointments   Date Time Provider Department Center   5/7/2025  3:30 PM Ede Garcia MD [id number]            Mateo Smith MD

## 2024-12-11 NOTE — PROGRESS NOTES
Cardiology Progress Note  Patient: Duane H Morgan Jr.  Unit/Bed: 809/809-A  YOB: 1965  MRN: [id number]  Acct: [id number]   Admitting Diagnosis:   A-fib (Multi) [I48.91]  Atrial fibrillation with RVR (Multi) [I48.91]  Date:  12/9/2024  Hospital Day: 1  Attending: Mateo Smith MD   Rounding SAURABH/Cardiologist:  RICARDO Thomas-CNP,        Primary Cardiologist: Dr. Ede Garcia      Complaint:  Chief Complaint   Patient presents with    Cough     I've been up all night coughing, my head hurts, my stomach hurts, my chest hurts from all this coughing        SUBJECTIVE    Patient feels well today no further fever or chills  Denies palpitations, dizziness, lightheadedness, chest pain  Telemetry shows atrial fibrillation with heart rate 70 to 120 bpm      VITALS   Visit Vitals  /80 (Patient Position: Lying)   Pulse 86   Temp 37.3 °C (99.1 °F) (Temporal)   Resp 24        I/O:    Intake/Output Summary (Last 24 hours) at 12/11/2024 1152  Last data filed at 12/11/2024 0745  Gross per 24 hour   Intake 480 ml   Output --   Net 480 ml        Allergies:  No Known Allergies     PHYSICAL EXAM   Physical Exam  Constitutional:       Appearance: Normal appearance.   HENT:      Head: Normocephalic.   Eyes:      Conjunctiva/sclera: Conjunctivae normal.   Cardiovascular:      Rate and Rhythm: Normal rate. Rhythm irregular.      Pulses: Normal pulses.      Heart sounds: Normal heart sounds.   Pulmonary:      Effort: Pulmonary effort is normal.      Breath sounds: Normal breath sounds.   Musculoskeletal:         General: Normal range of motion.   Skin:     General: Skin is warm and dry.   Neurological:      General: No focal deficit present.      Mental Status: He is alert and oriented to person, place, and time.   Psychiatric:         Mood and Affect: Mood normal.         Behavior: Behavior normal.           LABS     Results for orders placed or performed during the hospital encounter of 12/09/24 (from the past 24  hours)   Urinalysis with Reflex Culture and Microscopic   Result Value Ref Range    Color, Urine Light-Yellow Light-Yellow, Yellow, Dark-Yellow    Appearance, Urine Clear Clear    Specific Gravity, Urine 1.012 1.005 - 1.035    pH, Urine 5.5 5.0, 5.5, 6.0, 6.5, 7.0, 7.5, 8.0    Protein, Urine NEGATIVE NEGATIVE, 10 (TRACE), 20 (TRACE) mg/dL    Glucose, Urine Normal Normal mg/dL    Blood, Urine NEGATIVE NEGATIVE    Ketones, Urine NEGATIVE NEGATIVE mg/dL    Bilirubin, Urine NEGATIVE NEGATIVE    Urobilinogen, Urine Normal Normal mg/dL    Nitrite, Urine NEGATIVE NEGATIVE    Leukocyte Esterase, Urine NEGATIVE NEGATIVE   Extra Urine Gray Tube   Result Value Ref Range    Extra Tube Hold for add-ons.    Basic Metabolic Panel   Result Value Ref Range    Glucose 103 (H) 74 - 99 mg/dL    Sodium 130 (L) 136 - 145 mmol/L    Potassium 3.6 3.5 - 5.3 mmol/L    Chloride 98 98 - 107 mmol/L    Bicarbonate 24 21 - 32 mmol/L    Anion Gap 12 10 - 20 mmol/L    Urea Nitrogen 9 6 - 23 mg/dL    Creatinine 0.96 0.50 - 1.30 mg/dL    eGFR 90 >60 mL/min/1.73m*2    Calcium 8.4 (L) 8.6 - 10.3 mg/dL   CBC   Result Value Ref Range    WBC 4.9 4.4 - 11.3 x10*3/uL    nRBC 0.0 0.0 - 0.0 /100 WBCs    RBC 5.17 4.50 - 5.90 x10*6/uL    Hemoglobin 17.0 13.5 - 17.5 g/dL    Hematocrit 48.5 41.0 - 52.0 %    MCV 94 80 - 100 fL    MCH 32.9 26.0 - 34.0 pg    MCHC 35.1 32.0 - 36.0 g/dL    RDW 13.1 11.5 - 14.5 %    Platelets 137 (L) 150 - 450 x10*3/uL   Magnesium   Result Value Ref Range    Magnesium 1.89 1.60 - 2.40 mg/dL   ECG 12 Lead   Result Value Ref Range    Ventricular Rate 111 BPM    Atrial Rate 129 BPM    QRS Duration 86 ms    QT Interval 344 ms    QTC Calculation(Bazett) 467 ms    R Axis 81 degrees    T Axis 60 degrees    QRS Count 19 beats    Q Onset 219 ms    T Offset 391 ms    QTC Fredericia 422 ms        Scheduled medications  amLODIPine, 10 mg, oral, Nightly  apixaban, 5 mg, oral, BID  atorvastatin, 40 mg, oral, Nightly  carvedilol, 25 mg, oral,  BID  dilTIAZem CD, 240 mg, oral, Daily  hydrALAZINE, 100 mg, oral, TID  magnesium oxide, 400 mg, oral, BID  potassium chloride CR, 20 mEq, oral, BID  valsartan, 320 mg, oral, Daily      Continuous medications     PRN medications  PRN medications: acetaminophen **OR** acetaminophen, albuterol, metoprolol     ECG 12 Lead    Result Date: 12/11/2024  Atrial fibrillation with rapid ventricular response Abnormal ECG When compared with ECG of 10-DEC-2024 11:04, No significant change was found    ECG 12 Lead    Result Date: 12/10/2024  Atrial fibrillation Nonspecific ST and T wave abnormality Abnormal ECG When compared with ECG of 09-DEC-2024 06:08, (unconfirmed) Vent. rate has decreased BY  39 BPM T wave inversion no longer evident in Inferior leads Nonspecific T wave abnormality, improved in Lateral leads Confirmed by Ede Garcia ([id number]) on 12/10/2024 11:58:13 AM    ECG 12 lead    Result Date: 12/10/2024  Atrial fibrillation with rapid ventricular response with premature ventricular or aberrantly conducted complexes Septal infarct (cited on or before 25-OCT-2023) ST & T wave abnormality, consider inferior ischemia Abnormal ECG When compared with ECG of 09-DEC-2024 04:53, (unconfirmed) No significant change was found Confirmed by Ede Garcia ([id number]) on 12/10/2024 11:58:06 AM    ECG 12 lead    Result Date: 12/10/2024  Atrial fibrillation with rapid ventricular response Rightward axis Septal infarct (cited on or before 25-OCT-2023) Abnormal ECG When compared with ECG of 08-OCT-2024 06:19, Vent. rate has increased BY  56 BPM T wave inversion now evident in Inferior leads Nonspecific T wave abnormality, improved in Anterior leads Confirmed by Ede Garcia ([id number]) on 12/10/2024 11:58:01 AM    CT angio chest for pulmonary embolism    Result Date: 12/9/2024  STUDY: CT Angiogram of the Chest; 12/9/2024 6:09 AM. INDICATION: Tachycardia and shortness of breath; Evaluate for pulmonary embolism. COMPARISON: Chest radiograph  12/9/2024. ACCESSION NUMBER(S): [id number] ORDERING CLINICIAN: TORRI FOWLER TECHNIQUE:  CTA of the chest was performed with intravenous contrast. Images are reviewed and processed at a workstation according to the CT angiogram protocol with 3-D and/or MIP post processing imaging generated.  Omnipaque 350, 75 mL was administered intravenously. Automated mA/kV exposure control was utilized and patient examination was performed in strict accordance with principles of ALARA. FINDINGS: Pulmonary arteries are adequately opacified without acute or chronic filling defects.  There is mild dilatation of the ascending thoracic aorta which measures 4 cm in diameter.  Mild calcified atheromatous plaque is seen in the thoracic aorta. The heart is upper normal in size without pericardial effusion. Vascular stent is seen in the left anterior descending coronary artery.  Thoracic lymph nodes are not enlarged.  Small sliding-type hiatal hernia is noted. There is no pleural effusion, pleural thickening, or pneumothorax. The airways are patent. Lungs are clear without consolidation, interstitial disease, or suspicious nodules. Upper abdomen demonstrates no acute pathology.  Moderate calcified plaque is seen in the included upper abdominal aorta. There are no acute fractures.  No suspicious bony lesions.  Mild multilevel disc space narrowing is seen in the mid thoracic spine. Minimal levoconvex curvature is seen in the lower cervical and upper thoracic spine.    No definite acute thoracic pathology identified.  No evidence of pulmonary embolus. Signed by Oliver BECERRIL chest 1 view    Result Date: 12/9/2024  STUDY: Chest Radiograph;  12/09/2024 4:59 AM INDICATION: Shortness of breath. COMPARISON: CXR 8/9/2023. ACCESSION NUMBER(S): [id number] ORDERING CLINICIAN: TORRI FOWLER TECHNIQUE:  Frontal chest was obtained at 04:59 hours. FINDINGS: CARDIOMEDIASTINAL SILHOUETTE: Cardiomediastinal silhouette is normal in size and  configuration.  LUNGS: Lungs are clear.  ABDOMEN: No remarkable upper abdominal findings.  BONES: No acute osseous changes.    No acute cardiopulmonary disease. Signed by Oliver Maynard       Encounter Date: 12/09/24   ECG 12 Lead   Result Value    Ventricular Rate 111    Atrial Rate 129    QRS Duration 86    QT Interval 344    QTC Calculation(Bazett) 467    R Axis 81    T Axis 60    QRS Count 19    Q Onset 219    T Offset 391    QTC Fredericia 422    Narrative    Atrial fibrillation with rapid ventricular response  Abnormal ECG  When compared with ECG of 10-DEC-2024 11:04,  No significant change was found        Tele Monitoring: Atrial fibrillation with heart rate 70 to 120 bpm/EKG shows atrial fibrillation with heart rate 111 bpm and QTc 467 ms    Assessment     Patient Active Problem List   Diagnosis    CAD (coronary artery disease)    Cerebrovascular accident (CVA) due to occlusion of small artery (Multi)    Heart failure, unspecified    Hyperlipidemia, mixed    Longstanding persistent atrial fibrillation (Multi)    Primary hypertension    Status post insertion of drug-eluting stent into left anterior descending artery for coronary artery disease    Anticoagulant long-term use    Atrial fibrillation with RVR (Multi)    Pulmonary emphysema, unspecified emphysema type (Multi)    BMI 27.0-27.9,adult    Former smoker    A-fib (Multi)   Clinical impression:  1.  Febrile illness with cough-resolved  2.  Permanent atrial fibrillation on diltiazem, carvedilol, magnesium oxide, and anticoagulation with Eliquis.  Failure of sotalol and dofetilide to control arrhythmia  3.  Coronary artery disease with remote PCI PRINCESS to the proximal LAD on May 12, 2021 and 40% distal RCA stenosis managed medically.  Myoview stress test August 17, 2023 revealed no acute ischemic changes or infarct pattern with an EF of 56%  4.  Normal LVEF per transesophageal echocardiogram March 2023 as mentioned above  5.  Dyslipidemia  6.   Hypertension    Plan:  Continue carvedilol, diltiazem, magnesium oxide, and Eliquis as at home  Continue rate control strategy  Increase oral fluid intake secondary to fever and viral illness  Supplemental potassium to keep K over 4 and supplemental magnesium to keep mag over 2  Okay to discharge home per EP service  Outpatient follow-up with Dr. Garcia in May as previously scheduled       Electronically signed by RAMBO Thomas on 12/11/2024 at 11:52 AM

## 2024-12-14 LAB
BACTERIA BLD CULT: NORMAL
BACTERIA BLD CULT: NORMAL

## 2024-12-15 LAB
ATRIAL RATE: 129 BPM
Q ONSET: 219 MS
QRS COUNT: 19 BEATS
QRS DURATION: 86 MS
QT INTERVAL: 344 MS
QTC CALCULATION(BAZETT): 467 MS
QTC FREDERICIA: 422 MS
R AXIS: 81 DEGREES
T AXIS: 60 DEGREES
T OFFSET: 391 MS
VENTRICULAR RATE: 111 BPM

## 2025-01-02 ENCOUNTER — TELEPHONE (OUTPATIENT)
Dept: CARDIOLOGY | Facility: CLINIC | Age: 60
End: 2025-01-02
Payer: COMMERCIAL

## 2025-01-02 DIAGNOSIS — I48.11 LONGSTANDING PERSISTENT ATRIAL FIBRILLATION (MULTI): ICD-10-CM

## 2025-01-02 DIAGNOSIS — Z95.5 STATUS POST INSERTION OF DRUG-ELUTING STENT INTO LEFT ANTERIOR DESCENDING ARTERY FOR CORONARY ARTERY DISEASE: ICD-10-CM

## 2025-01-02 DIAGNOSIS — I10 PRIMARY HYPERTENSION: ICD-10-CM

## 2025-01-02 RX ORDER — CLOPIDOGREL BISULFATE 75 MG/1
75 TABLET ORAL DAILY
Qty: 90 TABLET | Refills: 3 | Status: SHIPPED | OUTPATIENT
Start: 2025-01-02 | End: 2026-01-02

## 2025-01-02 RX ORDER — HYDRALAZINE HYDROCHLORIDE 100 MG/1
100 TABLET, FILM COATED ORAL 3 TIMES DAILY
Qty: 270 TABLET | Refills: 3 | Status: SHIPPED | OUTPATIENT
Start: 2025-01-02 | End: 2026-01-02

## 2025-01-02 RX ORDER — LANOLIN ALCOHOL/MO/W.PET/CERES
1 CREAM (GRAM) TOPICAL 2 TIMES DAILY
Qty: 180 TABLET | Refills: 3 | Status: SHIPPED | OUTPATIENT
Start: 2025-01-02 | End: 2026-01-02

## 2025-01-02 RX ORDER — POTASSIUM CHLORIDE 20 MEQ/1
20 TABLET, EXTENDED RELEASE ORAL 2 TIMES DAILY
Qty: 180 TABLET | Refills: 3 | Status: SHIPPED | OUTPATIENT
Start: 2025-01-02 | End: 2026-01-02

## 2025-01-02 NOTE — TELEPHONE ENCOUNTER
RX's submitted for signature      Received: Today  MD Amber Rice, RN  Okay to refill all of them          Previous Messages       ----- Message -----  From: Amber Lozano RN  Sent: 1/2/2025   9:45 AM EST  To: Ede Garcia MD  Subject: FW: Refill request                              It looks like he is only seeing you. Would you be OK with refilling them or do you want me to send them to his PCP?  ----- Message -----  From: Ede Garcia MD  Sent: 12/31/2024  11:13 PM EST  To: Amber Lozano RN  Subject: FW: Refill request                              We can refill those meds  ----- Message -----  From: Ele Haque  Sent: 12/31/2024   1:02 PM EST  To: Ede Garcia MD  Subject: Refill request                                  Good afternoon,    Mr. Mcguire is a new patient to [facility].  We transferred what meds we could from his previous pharmacy but he needs refills on the following medications:    Plavix  Hydralazine 100mg  MagOx 400mg  Klor-Con 20 mEq    Thank you.  [facility]

## 2025-01-02 NOTE — TELEPHONE ENCOUNTER
Recvd [facility] paperwork for updating after hospital discharge 12/11/24.  Missing second page, where physician signs.  Called pt. To notify.  He states he will bring up the papers he has to the office later today.    Pt. Brought in second page of [facility] paperwork that was missing.  Completed and combined with records from most recent hospital visit.  Will have Dr. Garcia sign.  Pt. States he will  paperwork tomorrow after 3PM, and asked it also be faxed to [phone].

## 2025-01-03 NOTE — TELEPHONE ENCOUNTER
Dr. Garcia signed [facility] paperwork for [facility].  Faxed back along with all supporting documents from 10/23 OV with Dr. Garcia, hospital papers from 12/9, 12/10, and 12/11/24.  Original, physician signed copies placed back into folder supplied by patient, he now states he will  on Monday next week.  Copy made, will be placed to be scanned into chart.  Successful fax confirmation lenora  Spoke with pt. To advise this was completed.  He understood and will  his folder on Monday.

## 2025-04-07 DIAGNOSIS — I48.0 PAROXYSMAL ATRIAL FIBRILLATION (MULTI): ICD-10-CM

## 2025-04-07 DIAGNOSIS — I48.11 LONGSTANDING PERSISTENT ATRIAL FIBRILLATION (MULTI): ICD-10-CM

## 2025-04-07 DIAGNOSIS — E78.2 HYPERLIPIDEMIA, MIXED: ICD-10-CM

## 2025-04-07 DIAGNOSIS — I10 PRIMARY HYPERTENSION: ICD-10-CM

## 2025-04-07 DIAGNOSIS — I10 ESSENTIAL (PRIMARY) HYPERTENSION: ICD-10-CM

## 2025-04-07 RX ORDER — DILTIAZEM HYDROCHLORIDE 240 MG/1
240 CAPSULE, COATED, EXTENDED RELEASE ORAL DAILY
Qty: 90 CAPSULE | Refills: 3 | Status: SHIPPED | OUTPATIENT
Start: 2025-04-07 | End: 2026-04-07

## 2025-04-07 RX ORDER — AMLODIPINE BESYLATE 10 MG/1
10 TABLET ORAL NIGHTLY
Qty: 90 TABLET | Refills: 3 | Status: SHIPPED | OUTPATIENT
Start: 2025-04-07 | End: 2025-04-08 | Stop reason: WASHOUT

## 2025-04-07 RX ORDER — ATORVASTATIN CALCIUM 40 MG/1
40 TABLET, FILM COATED ORAL NIGHTLY
Qty: 90 TABLET | Refills: 3 | Status: SHIPPED | OUTPATIENT
Start: 2025-04-07 | End: 2025-04-08 | Stop reason: WASHOUT

## 2025-04-07 RX ORDER — VALSARTAN 320 MG/1
320 TABLET ORAL DAILY
Qty: 90 TABLET | Refills: 3 | Status: SHIPPED | OUTPATIENT
Start: 2025-04-07 | End: 2026-04-07

## 2025-04-07 RX ORDER — CARVEDILOL 25 MG/1
25 TABLET ORAL 2 TIMES DAILY
Qty: 180 TABLET | Refills: 3 | Status: SHIPPED | OUTPATIENT
Start: 2025-04-07 | End: 2026-04-07

## 2025-04-07 NOTE — TELEPHONE ENCOUNTER
Received request for prescription refill for patient.  Patient follows with Dr. Ede Garcia MD     Request is for amlodipine, Eliquis, Atorvastatin, carvedilol, diltiazem, valsartan.   Is patient currently on medication- yes    Last OV- 10/23/24  Next OV- 5/7/25    Pended for signing and sent to provider.

## 2025-04-07 NOTE — TELEPHONE ENCOUNTER
[facility] pharmacy called and LM asking if the amlodipine and atorvastatin can be combined into one medication (Caduet 10 mg - 40 mg) as this is more cost effective for patient. Call back number [phone]. Routed to Amber DOUGHERTY RN

## 2025-04-08 RX ORDER — AMLODIPINE BESYLATE AND ATORVASTATIN CALCIUM 10; 40 MG/1; MG/1
1 TABLET, FILM COATED ORAL DAILY
Qty: 90 TABLET | Refills: 3 | Status: SHIPPED | OUTPATIENT
Start: 2025-04-08 | End: 2026-04-08

## 2025-04-08 NOTE — TELEPHONE ENCOUNTER
4/8/25  Spoke with pt.  He does not have a PCP, and used to see Dr. Soy Smith, but has not had general cardiology follow up since 2022.  He is agreeable to seeing any general cardiologist.  Message sent back to Dr. Garcia.  Also, pt. Would like Caduet sent to [facility].  MD Amber Pino RN; Amanda Doran RN3 hours ago (10:02 AM)       I will prescribe those medications.  Could you please asked patient who is taking care of these medications for them     QUANG Mcdonough MD4 hours ago (8:23 AM)       Please advise. I don't see that he follows with general cardiology.     Amanda Doran RN routed conversation to Amber Lozano RN20 hours ago (4:43 PM)     Amanda Doran RN20 hours ago (4:43 PM)       [facility] pharmacy called and LM asking if the amlodipine and atorvastatin can be combined into one medication (Caduet 10 mg - 40 mg) as this is more cost effective for patient. Call back number [phone]. Routed to Amber DOUGHERTY RN          Note         [facility] - [place], [place] - [address] [phone]  Amanda Doran RN20 hours ago (4:26 PM)     Amanda Doran RN routed conversation to Ede Garcia MD23 hours ago (1

## 2025-05-07 ENCOUNTER — APPOINTMENT (OUTPATIENT)
Dept: CARDIOLOGY | Facility: CLINIC | Age: 60
End: 2025-05-07
Payer: COMMERCIAL

## 2025-05-07 VITALS
WEIGHT: 175 LBS | HEIGHT: 70 IN | BODY MASS INDEX: 25.05 KG/M2 | HEART RATE: 70 BPM | SYSTOLIC BLOOD PRESSURE: 130 MMHG | DIASTOLIC BLOOD PRESSURE: 70 MMHG

## 2025-05-07 DIAGNOSIS — R94.31 ABNORMAL EKG: ICD-10-CM

## 2025-05-07 DIAGNOSIS — I48.11 LONGSTANDING PERSISTENT ATRIAL FIBRILLATION (MULTI): Primary | ICD-10-CM

## 2025-05-07 DIAGNOSIS — Z51.81 ANTICOAGULATION MANAGEMENT ENCOUNTER: ICD-10-CM

## 2025-05-07 DIAGNOSIS — I49.5 SINUS NODE DYSFUNCTION (MULTI): ICD-10-CM

## 2025-05-07 DIAGNOSIS — Z87.891 FORMER SMOKER: ICD-10-CM

## 2025-05-07 DIAGNOSIS — Z79.01 ANTICOAGULATION MANAGEMENT ENCOUNTER: ICD-10-CM

## 2025-05-07 PROCEDURE — 93000 ELECTROCARDIOGRAM COMPLETE: CPT | Performed by: INTERNAL MEDICINE

## 2025-05-07 PROCEDURE — 3008F BODY MASS INDEX DOCD: CPT | Performed by: INTERNAL MEDICINE

## 2025-05-07 PROCEDURE — 99214 OFFICE O/P EST MOD 30 MIN: CPT | Performed by: INTERNAL MEDICINE

## 2025-05-07 PROCEDURE — 3075F SYST BP GE 130 - 139MM HG: CPT | Performed by: INTERNAL MEDICINE

## 2025-05-07 PROCEDURE — 1036F TOBACCO NON-USER: CPT | Performed by: INTERNAL MEDICINE

## 2025-05-07 PROCEDURE — 3078F DIAST BP <80 MM HG: CPT | Performed by: INTERNAL MEDICINE

## 2025-05-07 ASSESSMENT — ENCOUNTER SYMPTOMS
PALPITATIONS: 0
DYSPNEA ON EXERTION: 0

## 2025-05-07 NOTE — PATIENT INSTRUCTIONS
Continue same medications/treatment.  Patient educated on proper medication use.  Patient educated on risk factor modification.  Please bring any lab results from other providers/physicians to your next appointment.    Please bring all medicines, vitamins, and herbal supplements with you when you come to the office.    Prescriptions will not be filled unless you are compliant with your follow up appointments or have a follow up appointment scheduled as per instruction of your physician. Refills should be requested at the time of your visit.    Follow up with our physician assistant, Damari, in 6 months    Amber NAIR RN, AM SCRIBING FOR, AND IN THE PRESENCE OF DR. IRMA JONES MD

## 2025-05-07 NOTE — PROGRESS NOTES
CARDIOLOGY OFFICE VISIT      CHIEF COMPLAINT  Chief Complaint   Patient presents with    Follow-up     Pt. Present today for 6 month follow up for go over testing        HISTORY OF PRESENT ILLNESS  HPI  59-year-old  male who is followed for permanent atrial fibrillation on rate control strategy with carvedilol, diltiazem, magnesium oxide, and anticoagulated with Eliquis.    Admitted to [facility] 12.2025.He was admitted with acute hypoxic respiratory failure secondary afib with RVR  -on o2 today, CXR and CT chest negative on admission  -stopped cardizem gtt on 12/10  -IS wean o2 as tolerated now on RA   -continue coreg   -EP consulted recommended continue care and follow up outpatient, likely secondary to viral illness   Febrile: possible, Viral URI: supportive care, CXR and CT chest ok  -BC drawn NGTD on admission  -UA negative  - last fever yesterday evening, feels much better today, ambulating in room without difficulty, offered continue observation but he feels much better and ready to go home       Holter monitor 11/2024  This is a Holter monitor for 48 hours.     The underlying rhythm was atrial fibrillation rate of 95 bpm.  Atrial fibrillation was the main rhythm during this study.  The rates during atrial fibrillation very well-controlled.     The minimal heart rate was 41 bpm maximal heart rate was 118 bpm, with an average heart rate of 74 bpm.     There were occasionally isolated PVCs 1.16 ventricular arrhythmias.     The longest R-R interval was 2.5 seconds at 6:10 PM.     Patient did not report symptoms during this study.     Conclusion     This is an abnormal Holter monitor based on description above.  Underlying rhythm was atrial fibrillation.  Rates very well-controlled during atrial fibrillation.     Pauses up to 2.5 seconds of duration were seen during daytime and sleep time.  Asymptomatic.  Clinical correlation is to be made    Since the last office visit he has been doing well.  He denies any  symptoms of chest pain or self-worth or palpitations.    EKG performed today shows atrial fibrillation rate of 70 bpm QRS ration 84 ms QT corrected 449 ms.  Rhythm strip shows the same pattern.    Patient denies any dizziness lightheadedness.  Or syncope.    Past Medical History  Medical History[1]    Social History  Social History[2]    Family History   Family History[3]     Allergies:  RX Allergies[4]     Outpatient Medications:  Current Outpatient Medications   Medication Instructions    albuterol sulfate (Proair Digihaler) 90 mcg/actuation aero powdr breath act w/sensor inhaler 2 puffs, Every 6 hours PRN    amlodipine-atorvastatin (Caduet) 10-40 mg tablet 1 tablet, oral, Daily    apixaban (ELIQUIS) 5 mg, oral, 2 times daily    carvedilol (COREG) 25 mg, oral, 2 times daily    clopidogrel (PLAVIX) 75 mg, oral, Daily    dilTIAZem CD (CARDIZEM CD) 240 mg, oral, Daily    hydrALAZINE (APRESOLINE) 100 mg, oral, 3 times daily    magnesium oxide (MAG-OX) 400 mg, oral, 2 times daily    potassium chloride CR 20 mEq ER tablet 20 mEq, oral, 2 times daily    valsartan (DIOVAN) 320 mg, oral, Daily          REVIEW OF SYSTEMS  Review of Systems   Cardiovascular:  Negative for chest pain, dyspnea on exertion and palpitations.   All other systems reviewed and are negative.        VITALS  Vitals:    05/07/25 1536   BP: 130/70   Pulse: 70       PHYSICAL EXAM  Constitutional:       General: Awake.      Appearance: Normal and healthy appearance. Well-developed and not in distress.   Neck:      Vascular: No JVR. JVD normal.   Pulmonary:      Effort: Pulmonary effort is normal.      Breath sounds: Normal breath sounds. No wheezing. No rhonchi. No rales.   Chest:      Chest wall: Not tender to palpatation.   Cardiovascular:      PMI at left midclavicular line. Normal rate. Irregularly irregular rhythm. Normal S1. Normal S2.       Murmurs: There is no murmur.      No gallop.  No click. No rub.      Comments: Atrial fibrillation    Pulses:     Intact distal pulses.   Edema:     Peripheral edema absent.   Abdominal:      Tenderness: There is no abdominal tenderness.   Musculoskeletal: Normal range of motion.         General: No tenderness. Skin:     General: Skin is warm and dry.   Neurological:      General: No focal deficit present.      Mental Status: Alert and oriented to person, place and time.           ASSESSMENT AND PLAN      Clinical impressions:  1.  Permanent atrial fibrillation with slow ventricular response on diltiazem, carvedilol, magnesium oxide and anticoagulated with Eliquis. Failure of sotalol to control the arrhythmia.  2. Coronary artery disease status post angioplasty with drug-eluting stent to the proximal LAD on May 12, 2021 and 40% distal RCA stenosis managed medically.  Myoview stress test dated August 17, 2023 revealing no acute ischemic changes or infarct patterns with an ejection fraction of 56%.  3. Normal left ventricular function per 2D echocardiogram dated April 9, 2021.  4. Mild dilation of the ascending aorta per 2D echocardiogram dated April 9, 2021.  5. Dyslipidemia on statin.  6. Hypertension, .  7. Overweight with a BMI of 27.32.  8.  Sinus of dysfunction seen on Holter monitor    Plan recommendation    From the electrophysiologist on point he is doing well we will continue rate control strategy for atrial fibrillation.    So far he has not had any near syncope/syncopal episodes.  No need for pacemaker at this time.    I have personally reviewed results of the Holter monitor with patient during this office visit.    Continue Eliquis therapy.    Continue beta-blocker therapy.    Continue calcium channel blocker therapy.    Follow my office in 6 months or sooner if needed.    Risk factor modification and lifestyle modification discussed with patient. Diet , exercise and hydration discussed with patient.    I have personally review with patient during this office visit, laboratory data, echocardiogram  results, stress test results, Holter-event monitor results prior and after the last electrophysiology visit. All questions has been answered.    Please excuse any errors in grammar or translation related to this dictation.  Voice recognition software was utilized to prepare this document.      I, Dr. Garcia, personally performed the services described in the documentation as scribed by the nurse in my presence, and confirm it is both accurate and complete.            [1]   Past Medical History:  Diagnosis Date    Atrial fibrillation (Multi)     Hypertension     Overweight 2022    Overweight    Personal history of other endocrine, nutritional and metabolic disease     History of obesity   [2]   Social History  Tobacco Use    Smoking status: Former     Current packs/day: 0.00     Types: Cigarettes     Quit date:      Years since quittin.3    Smokeless tobacco: Never   Substance Use Topics    Alcohol use: Yes     Comment: 4x a year    Drug use: Never   [3]   Family History  Problem Relation Name Age of Onset    Cancer Father      Hyperlipidemia Father      Other (ICD in place) Father      Other (arteriosclerotic cardiovascular disease) Father     [4] No Known Allergies

## 2025-07-22 ENCOUNTER — APPOINTMENT (OUTPATIENT)
Dept: RADIOLOGY | Facility: HOSPITAL | Age: 60
End: 2025-07-22
Payer: COMMERCIAL

## 2025-07-22 ENCOUNTER — APPOINTMENT (OUTPATIENT)
Dept: CARDIOLOGY | Facility: HOSPITAL | Age: 60
End: 2025-07-22
Payer: COMMERCIAL

## 2025-07-22 ENCOUNTER — HOSPITAL ENCOUNTER (EMERGENCY)
Facility: HOSPITAL | Age: 60
Discharge: HOME | End: 2025-07-22
Attending: STUDENT IN AN ORGANIZED HEALTH CARE EDUCATION/TRAINING PROGRAM
Payer: COMMERCIAL

## 2025-07-22 VITALS
OXYGEN SATURATION: 98 % | DIASTOLIC BLOOD PRESSURE: 75 MMHG | HEART RATE: 84 BPM | HEIGHT: 70 IN | WEIGHT: 175 LBS | TEMPERATURE: 97.2 F | SYSTOLIC BLOOD PRESSURE: 132 MMHG | RESPIRATION RATE: 24 BRPM | BODY MASS INDEX: 25.05 KG/M2

## 2025-07-22 DIAGNOSIS — I48.91 ATRIAL FIBRILLATION, UNSPECIFIED TYPE (MULTI): Primary | ICD-10-CM

## 2025-07-22 LAB
ALBUMIN SERPL BCP-MCNC: 4 G/DL (ref 3.4–5)
ALP SERPL-CCNC: 87 U/L (ref 33–136)
ALT SERPL W P-5'-P-CCNC: 20 U/L (ref 10–52)
ANION GAP SERPL CALC-SCNC: 16 MMOL/L (ref 10–20)
AST SERPL W P-5'-P-CCNC: 30 U/L (ref 9–39)
ATRIAL RATE: 82 BPM
BASOPHILS # BLD AUTO: 0.03 X10*3/UL (ref 0–0.1)
BASOPHILS NFR BLD AUTO: 0.4 %
BILIRUB SERPL-MCNC: 1.9 MG/DL (ref 0–1.2)
BUN SERPL-MCNC: 12 MG/DL (ref 6–23)
CALCIUM SERPL-MCNC: 9.1 MG/DL (ref 8.6–10.3)
CARDIAC TROPONIN I PNL SERPL HS: 9 NG/L (ref 0–20)
CHLORIDE SERPL-SCNC: 98 MMOL/L (ref 98–107)
CO2 SERPL-SCNC: 22 MMOL/L (ref 21–32)
CREAT SERPL-MCNC: 1.01 MG/DL (ref 0.5–1.3)
EGFRCR SERPLBLD CKD-EPI 2021: 84 ML/MIN/1.73M*2
EOSINOPHIL # BLD AUTO: 0.15 X10*3/UL (ref 0–0.7)
EOSINOPHIL NFR BLD AUTO: 1.9 %
ERYTHROCYTE [DISTWIDTH] IN BLOOD BY AUTOMATED COUNT: 12.9 % (ref 11.5–14.5)
GLUCOSE SERPL-MCNC: 122 MG/DL (ref 74–99)
HCT VFR BLD AUTO: 50.4 % (ref 41–52)
HGB BLD-MCNC: 17.8 G/DL (ref 13.5–17.5)
HOLD SPECIMEN: NORMAL
HOLD SPECIMEN: NORMAL
IMM GRANULOCYTES # BLD AUTO: 0.02 X10*3/UL (ref 0–0.7)
IMM GRANULOCYTES NFR BLD AUTO: 0.3 % (ref 0–0.9)
LYMPHOCYTES # BLD AUTO: 1.85 X10*3/UL (ref 1.2–4.8)
LYMPHOCYTES NFR BLD AUTO: 23.8 %
MAGNESIUM SERPL-MCNC: 1.78 MG/DL (ref 1.6–2.4)
MCH RBC QN AUTO: 32.9 PG (ref 26–34)
MCHC RBC AUTO-ENTMCNC: 35.3 G/DL (ref 32–36)
MCV RBC AUTO: 93 FL (ref 80–100)
MONOCYTES # BLD AUTO: 0.71 X10*3/UL (ref 0.1–1)
MONOCYTES NFR BLD AUTO: 9.1 %
NEUTROPHILS # BLD AUTO: 5.02 X10*3/UL (ref 1.2–7.7)
NEUTROPHILS NFR BLD AUTO: 64.5 %
NRBC BLD-RTO: 0 /100 WBCS (ref 0–0)
PLATELET # BLD AUTO: 199 X10*3/UL (ref 150–450)
POTASSIUM SERPL-SCNC: 4.3 MMOL/L (ref 3.5–5.3)
PROT SERPL-MCNC: 7 G/DL (ref 6.4–8.2)
Q ONSET: 222 MS
QRS COUNT: 12 BEATS
QRS DURATION: 86 MS
QT INTERVAL: 420 MS
QTC CALCULATION(BAZETT): 481 MS
QTC FREDERICIA: 460 MS
R AXIS: 74 DEGREES
RBC # BLD AUTO: 5.41 X10*6/UL (ref 4.5–5.9)
SODIUM SERPL-SCNC: 132 MMOL/L (ref 136–145)
T AXIS: 80 DEGREES
T OFFSET: 432 MS
VENTRICULAR RATE: 79 BPM
WBC # BLD AUTO: 7.8 X10*3/UL (ref 4.4–11.3)

## 2025-07-22 PROCEDURE — 85025 COMPLETE CBC W/AUTO DIFF WBC: CPT | Performed by: STUDENT IN AN ORGANIZED HEALTH CARE EDUCATION/TRAINING PROGRAM

## 2025-07-22 PROCEDURE — 80053 COMPREHEN METABOLIC PANEL: CPT | Performed by: STUDENT IN AN ORGANIZED HEALTH CARE EDUCATION/TRAINING PROGRAM

## 2025-07-22 PROCEDURE — 83735 ASSAY OF MAGNESIUM: CPT | Performed by: STUDENT IN AN ORGANIZED HEALTH CARE EDUCATION/TRAINING PROGRAM

## 2025-07-22 PROCEDURE — 93005 ELECTROCARDIOGRAM TRACING: CPT

## 2025-07-22 PROCEDURE — 84484 ASSAY OF TROPONIN QUANT: CPT | Performed by: STUDENT IN AN ORGANIZED HEALTH CARE EDUCATION/TRAINING PROGRAM

## 2025-07-22 PROCEDURE — 99285 EMERGENCY DEPT VISIT HI MDM: CPT | Mod: 25 | Performed by: STUDENT IN AN ORGANIZED HEALTH CARE EDUCATION/TRAINING PROGRAM

## 2025-07-22 PROCEDURE — 71045 X-RAY EXAM CHEST 1 VIEW: CPT

## 2025-07-22 PROCEDURE — 71045 X-RAY EXAM CHEST 1 VIEW: CPT | Performed by: STUDENT IN AN ORGANIZED HEALTH CARE EDUCATION/TRAINING PROGRAM

## 2025-07-22 ASSESSMENT — LIFESTYLE VARIABLES
EVER FELT BAD OR GUILTY ABOUT YOUR DRINKING: NO
TOTAL SCORE: 0
HAVE YOU EVER FELT YOU SHOULD CUT DOWN ON YOUR DRINKING: NO
HAVE PEOPLE ANNOYED YOU BY CRITICIZING YOUR DRINKING: NO
EVER HAD A DRINK FIRST THING IN THE MORNING TO STEADY YOUR NERVES TO GET RID OF A HANGOVER: NO

## 2025-07-22 ASSESSMENT — PAIN SCALES - GENERAL: PAINLEVEL_OUTOF10: 7

## 2025-07-22 ASSESSMENT — PAIN - FUNCTIONAL ASSESSMENT: PAIN_FUNCTIONAL_ASSESSMENT: 0-10

## 2025-07-22 NOTE — ED TRIAGE NOTES
"Pt comes in from home for felling palpitations at home. Pt states \"it doesn't feel like my heart was beating faster, but it was going cody-patter. I have had this afib problem for the last 17 years.\" Pt states he she's Dr. Garcia, and saw Dr. Garcia lat month with NNO.  Pt denies any new SOB or difficulty breathing. Pt ambulatory with steady gait. Pt drove himself to the ER.  "

## 2025-07-22 NOTE — ED PROVIDER NOTES
HPI   Chief Complaint   Patient presents with    Palpitations       Patient is a 60-year-old male with past medical history of atrial fibrillation who presents the ED for palpitations.  Patient states that tonight he has been having on and off feelings like his heart is beating irregularly and faster than normal.  He does have known atrial fibrillation and is on anticoagulation and is compliant with his medications.  He denies any chest pain or shortness of breath.              Patient History   Medical History[1]  Surgical History[2]  Family History[3]  Social History[4]    Physical Exam   ED Triage Vitals [07/22/25 0454]   Temperature Heart Rate Respirations BP   36 °C (96.8 °F) 77 18 138/79      Pulse Ox Temp Source Heart Rate Source Patient Position   97 % Temporal Monitor --      BP Location FiO2 (%)     Right arm --       Physical Exam  Vitals and nursing note reviewed.   Constitutional:       General: He is not in acute distress.     Appearance: He is not toxic-appearing.   HENT:      Mouth/Throat:      Mouth: Mucous membranes are moist.     Cardiovascular:      Rate and Rhythm: Normal rate. Rhythm irregular.      Heart sounds: No murmur heard.     No gallop.   Pulmonary:      Effort: Pulmonary effort is normal. No respiratory distress.      Breath sounds: Normal breath sounds. No wheezing or rales.     Musculoskeletal:      Cervical back: Neck supple.     Skin:     General: Skin is warm and dry.     Neurological:      Mental Status: He is alert.           ED Course & MDM   Diagnoses as of 07/22/25 0606   Atrial fibrillation, unspecified type (Multi)                 No data recorded     Marquise Coma Scale Score: 15 (07/22/25 0457 : Lauren Oseguera, QUNAG)                           Medical Decision Making  EKG is interpreted by me as atrial fibrillation with a ventricular rate of 79 bpm.  Normal axis.  QTc of 481.  No ST elevation or depression.    Patient is well-appearing in no distress.  He is in atrial  fibrillation and his heart rates going between 70 and 90s.  Workup was obtained here in the ED.  Labs are overall stable.  Chest x-ray shows no acute process.  Patient remained stable on the monitor averaging in the 70s and 80s.  I discussed them that he is in atrial fibrillation but is rate controlled and is properly anticoagulated.  At this time I feel he is safe for discharge with follow-up with his cardiologist.  Patient is in agreement with this plan and was discharged in stable condition.        Procedure  Procedures         [1]   Past Medical History:  Diagnosis Date    Atrial fibrillation (Multi)     Hypertension     Overweight 2022    Overweight    Personal history of other endocrine, nutritional and metabolic disease     History of obesity   [2]   Past Surgical History:  Procedure Laterality Date    OTHER SURGICAL HISTORY  10/16/2021    Cardiac catheterization with stent placement    OTHER SURGICAL HISTORY  10/16/2021    Inguinal hernia repair    OTHER SURGICAL HISTORY  2022    Cardioversion   [3]   Family History  Problem Relation Name Age of Onset    Cancer Father      Hyperlipidemia Father      Other (ICD in place) Father      Other (arteriosclerotic cardiovascular disease) Father     [4]   Social History  Tobacco Use    Smoking status: Former     Current packs/day: 0.00     Types: Cigarettes     Quit date:      Years since quittin.5    Smokeless tobacco: Never   Substance Use Topics    Alcohol use: Yes     Comment: 4x a year    Drug use: Never        Ronen Barber DO  25 [phone]

## 2025-07-22 NOTE — Clinical Note
Duane Morgan was seen and treated in our emergency department on 7/22/2025.  He may return to work on 07/23/2025.       If you have any questions or concerns, please don't hesitate to call.      Ronen Barber, DO

## 2025-07-30 LAB
ATRIAL RATE: 82 BPM
Q ONSET: 222 MS
QRS COUNT: 12 BEATS
QRS DURATION: 86 MS
QT INTERVAL: 420 MS
QTC CALCULATION(BAZETT): 481 MS
QTC FREDERICIA: 460 MS
R AXIS: 74 DEGREES
T AXIS: 80 DEGREES
T OFFSET: 432 MS
VENTRICULAR RATE: 79 BPM

## 2025-08-01 ENCOUNTER — DOCUMENTATION (OUTPATIENT)
Dept: CARDIOLOGY | Facility: CLINIC | Age: 60
End: 2025-08-01
Payer: COMMERCIAL

## 2025-11-12 ENCOUNTER — APPOINTMENT (OUTPATIENT)
Dept: CARDIOLOGY | Facility: CLINIC | Age: 60
End: 2025-11-12
Payer: COMMERCIAL